# Patient Record
Sex: FEMALE | Race: BLACK OR AFRICAN AMERICAN | Employment: FULL TIME | URBAN - METROPOLITAN AREA
[De-identification: names, ages, dates, MRNs, and addresses within clinical notes are randomized per-mention and may not be internally consistent; named-entity substitution may affect disease eponyms.]

---

## 2017-02-16 ENCOUNTER — HOSPITAL ENCOUNTER (EMERGENCY)
Age: 42
Discharge: HOME | End: 2017-02-16
Attending: EMERGENCY MEDICINE
Payer: SELF-PAY

## 2017-02-16 VITALS
WEIGHT: 140 LBS | HEART RATE: 79 BPM | SYSTOLIC BLOOD PRESSURE: 193 MMHG | BODY MASS INDEX: 24.8 KG/M2 | HEIGHT: 63 IN | OXYGEN SATURATION: 99 % | DIASTOLIC BLOOD PRESSURE: 96 MMHG | RESPIRATION RATE: 16 BRPM | TEMPERATURE: 97.8 F

## 2017-02-16 DIAGNOSIS — M54.6 ACUTE LEFT-SIDED THORACIC BACK PAIN: Primary | ICD-10-CM

## 2017-02-16 PROCEDURE — 99282 EMERGENCY DEPT VISIT SF MDM: CPT

## 2017-02-16 RX ORDER — HYDROCODONE BITARTRATE AND ACETAMINOPHEN 5; 325 MG/1; MG/1
1 TABLET ORAL
Qty: 12 TAB | Refills: 0 | Status: SHIPPED | OUTPATIENT
Start: 2017-02-16 | End: 2017-10-23 | Stop reason: ALTCHOICE

## 2017-02-16 RX ORDER — POTASSIUM CHLORIDE 750 MG/1
20 TABLET, FILM COATED, EXTENDED RELEASE ORAL
COMMUNITY
End: 2017-10-23 | Stop reason: ALTCHOICE

## 2017-02-16 RX ORDER — METAXALONE 800 MG/1
800 TABLET ORAL
Qty: 15 TAB | Refills: 0 | Status: SHIPPED | OUTPATIENT
Start: 2017-02-16 | End: 2017-10-23 | Stop reason: ALTCHOICE

## 2017-02-16 NOTE — ED TRIAGE NOTES
Patient presents ambulatory to treatment area with a steady gait. Patient complains of upper left back pain that began on Saturday. Patient denies injury to the area. Patient was seen at [facility] on Monday for this problem, but the pain persists. Patient has been taking ibuprofen for that pain with no relief.

## 2017-02-16 NOTE — ED PROVIDER NOTES
HPI Comments: 68-year-old  female presents to the emergency department with back pain. Patient reports she's had pain in the left shoulder blade over the past 5 days. Patient reports she was seen at [facility] emergency department 3 days ago. She reports they did a chest x-ray, EKG, CT scan of the chest, blood work. It always normal. She has been taking ibuprofen. She says the pain has persisted. The pain is worse with palpation. Pain is worse if she lifts her left arm over her head. The pain does not radiate. The pain is mild in severity. She denies any chest pain. No shortness of breath. No abdominal pain. No vomiting or diarrhea. Patient denies having any other medical problems. Patient denies any history of heart attack or a blood clot. No allergies to medications. She denies tobacco or alcohol use. The history is provided by the patient. Past Medical History:   Diagnosis Date    Delivery normal     Hypertension        Past Surgical History:   Procedure Laterality Date    Hx cholecystectomy      Hx other surgical       pancreatic cyst  removed    Hx gyn       tubal ligation         Family History:   Problem Relation Age of Onset    Stroke Mother        Social History     Social History    Marital status:      Spouse name: N/A    Number of children: N/A    Years of education: N/A     Occupational History    Not on file. Social History Main Topics    Smoking status: Never Smoker    Smokeless tobacco: Never Used    Alcohol use No    Drug use: No    Sexual activity: Not Currently     Other Topics Concern    Not on file     Social History Narrative         ALLERGIES: Review of patient's allergies indicates no known allergies. Review of Systems   Constitutional: Negative for fever. HENT: Negative for facial swelling and nosebleeds. Eyes: Negative for pain. Respiratory: Negative for cough, chest tightness and shortness of breath.     Cardiovascular: Negative for chest pain and leg swelling. Gastrointestinal: Negative for abdominal pain and vomiting. Endocrine: Negative for polyuria. Genitourinary: Negative for difficulty urinating and flank pain. Musculoskeletal: Positive for arthralgias and back pain. Negative for gait problem, joint swelling, neck pain and neck stiffness. Skin: Negative for color change. Allergic/Immunologic: Negative for immunocompromised state. Neurological: Negative for dizziness and headaches. Hematological: Does not bruise/bleed easily. Psychiatric/Behavioral: Negative for agitation. All other systems reviewed and are negative. Vitals:    02/16/17 1813   BP: (!) 193/96   Pulse: 79   Resp: 16   Temp: 97.8 °F (36.6 °C)   SpO2: 99%   Weight: 63.5 kg (140 lb)   Height: 5' 3\" (1.6 m)            Physical Exam   Constitutional: She is oriented to person, place, and time. She appears well-developed and well-nourished. No distress. HENT:   Head: Normocephalic and atraumatic. Right Ear: External ear normal.   Left Ear: External ear normal.   Nose: Nose normal.   Mouth/Throat: Oropharynx is clear and moist. No oropharyngeal exudate. Eyes: EOM are normal. Pupils are equal, round, and reactive to light. Right eye exhibits no discharge. Left eye exhibits no discharge. No scleral icterus. Neck: Normal range of motion. Neck supple. No JVD present. No tracheal deviation present. No thyromegaly present. Cardiovascular: Normal rate, regular rhythm, normal heart sounds and intact distal pulses. Exam reveals no friction rub. No murmur heard. Pulmonary/Chest: Effort normal and breath sounds normal. No stridor. No respiratory distress. She has no wheezes. She has no rales. She exhibits no tenderness. Abdominal: Soft. Bowel sounds are normal. She exhibits no distension. There is no tenderness. There is no rebound and no guarding. Musculoskeletal: Normal range of motion. She exhibits tenderness.  She exhibits no edema or deformity. Very mild tenderness over the muscle over her left shoulder blade, normal ROM of left shoulder, no pain in right back. Lymphadenopathy:     She has no cervical adenopathy. Neurological: She is alert and oriented to person, place, and time. She has normal reflexes. No cranial nerve deficit. She exhibits normal muscle tone. Coordination normal.   Skin: Skin is warm and dry. No rash noted. She is not diaphoretic. No erythema. No pallor. Psychiatric: She has a normal mood and affect. Her behavior is normal. Judgment and thought content normal.   Nursing note and vitals reviewed. MDM  Number of Diagnoses or Management Options  Diagnosis management comments: Patient has very mild pain in the left shoulder blade. I suspect the pain is muscular. No shortness of breath. Lungs are clear. She had a full workup several days ago at an outside facility. No indication for further testing. We'll treat her with a muscle relaxant, pain medication, continue ibuprofen. Return precautions given. Patient agrees with this plan. Amount and/or Complexity of Data Reviewed  Decide to obtain previous medical records or to obtain history from someone other than the patient: yes  Review and summarize past medical records: yes  Independent visualization of images, tracings, or specimens: yes    Risk of Complications, Morbidity, and/or Mortality  Presenting problems: low  Diagnostic procedures: low  Management options: low    Patient Progress  Patient progress: improved    ED Course       Procedures    Good return precautions given to patient. Close follow up with PCP recommended. Patient and/or family voices understanding of this plan. Discharge instructions were explained by me and all concerns were addressed.

## 2017-02-16 NOTE — DISCHARGE INSTRUCTIONS
Back Pain: Care Instructions  Your Care Instructions    Back pain has many possible causes. It is often related to problems with muscles and ligaments of the back. It may also be related to problems with the nerves, discs, or bones of the back. Moving, lifting, standing, sitting, or sleeping in an awkward way can strain the back. Sometimes you don't notice the injury until later. Arthritis is another common cause of back pain. Although it may hurt a lot, back pain usually improves on its own within several weeks. Most people recover in 12 weeks or less. Using good home treatment and being careful not to stress your back can help you feel better sooner. Follow-up care is a key part of your treatment and safety. Be sure to make and go to all appointments, and call your doctor if you are having problems. Its also a good idea to know your test results and keep a list of the medicines you take. How can you care for yourself at home? · Sit or lie in positions that are most comfortable and reduce your pain. Try one of these positions when you lie down:  ¨ Lie on your back with your knees bent and supported by large pillows. ¨ Lie on the floor with your legs on the seat of a sofa or chair. Clevester Sanes on your side with your knees and hips bent and a pillow between your legs. ¨ Lie on your stomach if it does not make pain worse. · Do not sit up in bed, and avoid soft couches and twisted positions. Bed rest can help relieve pain at first, but it delays healing. Avoid bed rest after the first day of back pain. · Change positions every 30 minutes. If you must sit for long periods of time, take breaks from sitting. Get up and walk around, or lie in a comfortable position. · Try using a heating pad on a low or medium setting for 15 to 20 minutes every 2 or 3 hours. Try a warm shower in place of one session with the heating pad. · You can also try an ice pack for 10 to 15 minutes every 2 to 3 hours.  Put a thin cloth between the ice pack and your skin. · Take pain medicines exactly as directed. ¨ If the doctor gave you a prescription medicine for pain, take it as prescribed. ¨ If you are not taking a prescription pain medicine, ask your doctor if you can take an over-the-counter medicine. · Take short walks several times a day. You can start with 5 to 10 minutes, 3 or 4 times a day, and work up to longer walks. Walk on level surfaces and avoid hills and stairs until your back is better. · Return to work and other activities as soon as you can. Continued rest without activity is usually not good for your back. · To prevent future back pain, do exercises to stretch and strengthen your back and stomach. Learn how to use good posture, safe lifting techniques, and proper body mechanics. When should you call for help? Call your doctor now or seek immediate medical care if:  · You have new or worsening numbness in your legs. · You have new or worsening weakness in your legs. (This could make it hard to stand up.)  · You lose control of your bladder or bowels. Watch closely for changes in your health, and be sure to contact your doctor if:  · Your pain gets worse. · You are not getting better after 2 weeks. Where can you learn more? Go to http://jaleesa-silvia.info/. Enter P891 in the search box to learn more about \"Back Pain: Care Instructions. \"  Current as of: May 23, 2016  Content Version: 11.1  © 6951-9375 [employer]. Care instructions adapted under license by [employer] (which disclaims liability or warranty for this information). If you have questions about a medical condition or this instruction, always ask your healthcare professional. [address] any warranty or liability for your use of this information. We hope that we have addressed all of your medical concerns.  The examination and treatment you received in the Emergency Department were for an emergent problem and were not intended as complete care. It is important that you follow up with your healthcare provider(s) for ongoing care. If your symptoms worsen or do not improve as expected, and you are unable to reach your usual health care provider(s), you should return to the Emergency Department. Today's healthcare is undergoing tremendous change, and patient satisfaction surveys are one of the many tools to assess the quality of medical care. You may receive a survey from the [employer] [place] regarding your experience in the Emergency Department. I hope that your experience has been completely positive, particularly the medical care that I provided. As such, please participate in the survey; anything less than excellent does not meet my expectations or intentions. [facility] and [address] [place] [address] participate in nationally recognized quality of care measures. If your blood pressure is greater than 120/80, as reported below, we urge that you seek medical care to address the potential of high blood pressure, commonly known as hypertension. Hypertension can be hereditary or can be caused by certain medical conditions, pain, stress, or \"white coat syndrome. \"       Please make an appointment with your health care provider(s) for follow up of your Emergency Department visit. VITALS:   Patient Vitals for the past 8 hrs:   Temp Pulse Resp BP SpO2   02/16/17 1813 97.8 °F (36.6 °C) 79 16 (!) 193/96 99 %          Thank you for allowing us to provide you with medical care today. We realize that you have many choices for your emergency care needs. Please choose us in the future for any continued health care needs.       Pablo Mar MD    [facility].   Office: [phone]

## 2017-09-05 ENCOUNTER — HOSPITAL ENCOUNTER (OUTPATIENT)
Dept: MAMMOGRAPHY | Age: 42
Discharge: HOME | End: 2017-09-05
Attending: NURSE PRACTITIONER
Payer: SELF-PAY

## 2017-09-05 DIAGNOSIS — Z12.31 VISIT FOR SCREENING MAMMOGRAM: ICD-10-CM

## 2017-09-05 PROCEDURE — 77067 SCR MAMMO BI INCL CAD: CPT

## 2017-10-04 ENCOUNTER — HOSPITAL ENCOUNTER (OUTPATIENT)
Dept: MAMMOGRAPHY | Age: 42
Discharge: HOME | End: 2017-10-04
Attending: FAMILY MEDICINE

## 2017-10-04 ENCOUNTER — OFFICE VISIT (OUTPATIENT)
Dept: FAMILY PLANNING/WOMEN'S HEALTH CLINIC | Age: 42
End: 2017-10-04

## 2017-10-04 VITALS — SYSTOLIC BLOOD PRESSURE: 178 MMHG | HEART RATE: 75 BPM | DIASTOLIC BLOOD PRESSURE: 109 MMHG

## 2017-10-04 DIAGNOSIS — R92.8 ABNORMAL MAMMOGRAM: Primary | ICD-10-CM

## 2017-10-04 DIAGNOSIS — R92.8 ABNORMAL MAMMOGRAM: ICD-10-CM

## 2017-10-04 PROCEDURE — 77065 DX MAMMO INCL CAD UNI: CPT

## 2017-10-04 NOTE — PROGRESS NOTES
EVERY WOMANS LIFE HISTORY QUESTIONNAIRE       No Yes Comments   Has a doctor ever seen or felt anything wrong with your breast? [x]                                  []                                     Have you ever had a breast biopsy? [x]                                  []                                          When and where was last mammogram performed? None    Have you ever been told that there was a problem on your mammogram?   No Yes Comments   [x]                                  []                                       Do you have breast implants? No Yes Comments   [x]                                  []                                       When was your last Pap test performed? 2 years ago    Have you ever been diagnosed with any type of Cancer   No Yes Comments (type,when,where,type of treatment   [x]                                  []                                          Has a family member been diagnosed with breast or ovarian cancer? No Yes Comments (which family members, and type   [x]                                  []                                         Have you been through menopause? No Yes Date of LMP   [x]                                  []                                       Are you taking hormone replacement therapy (HRT)     No Yes Comments   [x]                                  []                                       How many times have you been pregnant? 3          Number of live births ? 3    Are you experiencing any of the following? No Yes Comments   Nipple Discharge [x]                                  []                                     Breast Lump/Masses [x]                                  []                                     Breast Skin Changes [x]                                  []                                         Any other health problems? HBP      List of Pap Providers given to pt? yes    Pt given care A [place] [facility]ito calendar to follow up with.  Pt has not had insurance and been unable to see PCP. Pt has been on HBP meds but has no refill. Explained how care a Mal Dietz worked, Pt verbalized understanding and denies questions. Pt told if she experiences any shortness of breath, headache, chest pain, dizziness, pain radiating to jaw, back, arm that she should go to ED, Pt verbalized understanding and denies questions.

## 2017-10-04 NOTE — PROGRESS NOTES
HISTORY OF PRESENT ILLNESS  Ernestina Lombardo is a 43 y.o. female. HPI Comments: H/o recent mammo showing asymmetric breast tissue, concerning on the right. She has felt no lumps. Currently is not being followed for her htn. Lives in [facility] where the [facility] comes and where there is a [address]. Reports she has transport problems and difficulty [facility].  Usually gets hctz rx when she has to go to ED for other reasons. [facility]         ROS    Physical Exam   Pulmonary/Chest:   Breasts without dominant mass. No nipple d/c,no axillary masses. ASSESSMENT and PLAN  Additional views of Right breast scheduled today. Discussed that she can go to [facility] in [address] of this month, get there before 8.

## 2017-10-23 ENCOUNTER — OFFICE VISIT (OUTPATIENT)
Dept: FAMILY MEDICINE CLINIC | Age: 42
End: 2017-10-23

## 2017-10-23 ENCOUNTER — HOSPITAL ENCOUNTER (OUTPATIENT)
Dept: LAB | Age: 42
Discharge: HOME | End: 2017-10-23

## 2017-10-23 VITALS
WEIGHT: 154 LBS | BODY MASS INDEX: 27.29 KG/M2 | SYSTOLIC BLOOD PRESSURE: 165 MMHG | DIASTOLIC BLOOD PRESSURE: 102 MMHG | HEIGHT: 63 IN | HEART RATE: 67 BPM | TEMPERATURE: 97.7 F

## 2017-10-23 DIAGNOSIS — G89.29 CHRONIC NONINTRACTABLE HEADACHE, UNSPECIFIED HEADACHE TYPE: ICD-10-CM

## 2017-10-23 DIAGNOSIS — R51.9 CHRONIC NONINTRACTABLE HEADACHE, UNSPECIFIED HEADACHE TYPE: ICD-10-CM

## 2017-10-23 DIAGNOSIS — I10 ESSENTIAL HYPERTENSION: Primary | ICD-10-CM

## 2017-10-23 DIAGNOSIS — I10 ESSENTIAL HYPERTENSION: ICD-10-CM

## 2017-10-23 LAB
ALBUMIN SERPL-MCNC: 4.1 G/DL (ref 3.5–5)
ALBUMIN/GLOB SERPL: 1 {RATIO} (ref 1.1–2.2)
ALP SERPL-CCNC: 82 U/L (ref 45–117)
ALT SERPL-CCNC: 24 U/L (ref 12–78)
ANION GAP SERPL CALC-SCNC: 8 MMOL/L (ref 5–15)
AST SERPL-CCNC: 14 U/L (ref 15–37)
BILIRUB SERPL-MCNC: 0.4 MG/DL (ref 0.2–1)
BUN SERPL-MCNC: 12 MG/DL (ref 6–20)
BUN/CREAT SERPL: 14 (ref 12–20)
CALCIUM SERPL-MCNC: 9.1 MG/DL (ref 8.5–10.1)
CHLORIDE SERPL-SCNC: 101 MMOL/L (ref 97–108)
CHOLEST SERPL-MCNC: 170 MG/DL
CO2 SERPL-SCNC: 29 MMOL/L (ref 21–32)
CREAT SERPL-MCNC: 0.85 MG/DL (ref 0.55–1.02)
ERYTHROCYTE [DISTWIDTH] IN BLOOD BY AUTOMATED COUNT: 12.8 % (ref 11.5–14.5)
EST. AVERAGE GLUCOSE BLD GHB EST-MCNC: 111 MG/DL
GLOBULIN SER CALC-MCNC: 4.1 G/DL (ref 2–4)
GLUCOSE SERPL-MCNC: 91 MG/DL (ref 65–100)
HBA1C MFR BLD: 5.5 % (ref 4.2–6.3)
HCT VFR BLD AUTO: 38.5 % (ref 35–47)
HDLC SERPL-MCNC: 55 MG/DL
HDLC SERPL: 3.1 {RATIO} (ref 0–5)
HGB BLD-MCNC: 13.1 G/DL (ref 11.5–16)
LDLC SERPL CALC-MCNC: 102 MG/DL (ref 0–100)
LIPID PROFILE,FLP: ABNORMAL
MCH RBC QN AUTO: 29.6 PG (ref 26–34)
MCHC RBC AUTO-ENTMCNC: 34 G/DL (ref 30–36.5)
MCV RBC AUTO: 87.1 FL (ref 80–99)
PLATELET # BLD AUTO: 267 K/UL (ref 150–400)
POTASSIUM SERPL-SCNC: 3.7 MMOL/L (ref 3.5–5.1)
PROT SERPL-MCNC: 8.2 G/DL (ref 6.4–8.2)
RBC # BLD AUTO: 4.42 M/UL (ref 3.8–5.2)
SODIUM SERPL-SCNC: 138 MMOL/L (ref 136–145)
TRIGL SERPL-MCNC: 65 MG/DL (ref ?–150)
VLDLC SERPL CALC-MCNC: 13 MG/DL
WBC # BLD AUTO: 5.3 K/UL (ref 3.6–11)

## 2017-10-23 PROCEDURE — 85027 COMPLETE CBC AUTOMATED: CPT | Performed by: NURSE PRACTITIONER

## 2017-10-23 PROCEDURE — 80053 COMPREHEN METABOLIC PANEL: CPT | Performed by: NURSE PRACTITIONER

## 2017-10-23 PROCEDURE — 80061 LIPID PANEL: CPT | Performed by: NURSE PRACTITIONER

## 2017-10-23 PROCEDURE — 83036 HEMOGLOBIN GLYCOSYLATED A1C: CPT | Performed by: NURSE PRACTITIONER

## 2017-10-23 RX ORDER — LISINOPRIL AND HYDROCHLOROTHIAZIDE 12.5; 2 MG/1; MG/1
1 TABLET ORAL DAILY
Qty: 90 TAB | Refills: 0 | Status: SHIPPED | OUTPATIENT
Start: 2017-10-23 | End: 2017-11-27 | Stop reason: DRUGHIGH

## 2017-10-23 RX ORDER — AMLODIPINE BESYLATE 10 MG/1
10 TABLET ORAL DAILY
Qty: 30 TAB | Refills: 3 | Status: SHIPPED | OUTPATIENT
Start: 2017-10-23 | End: 2017-11-27 | Stop reason: SDUPTHER

## 2017-10-23 NOTE — PROGRESS NOTES
Subjective:     Chief Complaint   Patient presents with    Hypertension     follow up     Headache        She  is a 43 y.o. female who presents for evaluation of HTN. Pt is currently taking Lisinopril/HCTZ as Rx'd when she was admitted at [address] [place] [address] for 2-3 days earlier this month r/t  HTN, h/a and CP. Since starting Lisinopril/HCTZ 20/12.5, well tolerated and her CP has resolved. H/A still continues since starting Rx. No prior HTN use aside from HCTZ and Lisinopril. Current Rx back. PMH: HTN    Surg:  Partial pancreatectomy, lap appendectomey and surgical sterilization. NKDA     Current Rx/supplements: denies     Social:  Pt denies tobacco, etoh nor drug use. ROS  Gen - no fever/chills  Resp - no dyspnea or cough  CV - no chest pain or HUNTLEY  Rest per HPI    Past Medical History:   Diagnosis Date    Delivery normal     Hypertension      Past Surgical History:   Procedure Laterality Date    HX CHOLECYSTECTOMY      HX GYN      tubal ligation    HX OTHER SURGICAL      pancreatic cyst  removed     Current Outpatient Prescriptions on File Prior to Visit   Medication Sig Dispense Refill    potassium chloride SR (KLOR-CON 10) 10 mEq tablet Take 20 mEq by mouth.  metaxalone (SKELAXIN) 800 mg tablet Take 1 Tab by mouth three (3) times daily as needed for Pain (muscle spasm). 15 Tab 0    HYDROcodone-acetaminophen (NORCO) 5-325 mg per tablet Take 1 Tab by mouth every six (6) hours as needed for Pain. Max Daily Amount: 4 Tabs. 12 Tab 0    LISINOPRIL-HYDROCHLOROTHIAZIDE PO Take  by mouth.  ibuprofen (MOTRIN) 600 mg tablet Take 1 Tab by mouth every six (6) hours as needed for Pain. 20 Tab 0    Aspirin, Buffered 81 mg tab Take  by mouth. No current facility-administered medications on file prior to visit.          Objective:     Vitals:    10/23/17 1112 10/23/17 1117   BP: (!) 178/100 (!) 165/102   Pulse: 66 67   Temp: 97.7 °F (36.5 °C)    TempSrc: Oral    Weight: 154 lb (69.9 kg)    Height: 5' 2.8\" (1.595 m)        Physical Examination:  General appearance - alert, well appearing, and in no distress  Eyes -sclera anicteric  Neck - supple, no significant adenopathy, no thyromegaly  Chest - clear to auscultation, no wheezes, rales or rhonchi, symmetric air entry  Heart - normal rate, regular rhythm, normal S1, S2, no murmurs, rubs, clicks or gallops  Neurological - alert, oriented, no focal findings or movement disorder noted  Abdomen-BS present/WNL x 4 quads, non-tender/distended, soft,no organomegaly    Assessment/ Plan:   Diagnoses and all orders for this visit:    1. Essential hypertension  -     lisinopril-hydroCHLOROthiazide (PRINZIDE, ZESTORETIC) 20-12.5 mg per tablet; Take 1 Tab by mouth daily. -     amLODIPine (NORVASC) 10 mg tablet; Take 1 Tab by mouth daily.  -     METABOLIC PANEL, COMPREHENSIVE; Future  -     HEMOGLOBIN A1C WITH EAG; Future  -     CBC W/O DIFF; Future  -     LIPID PANEL; Future    2. Chronic nonintractable headache, unspecified headache type      Add Norvasc for improved BP control. Will attempt to hospital records and echo/labs/CT scan. Advised to start 1/2 tab of norvasc x 4-5 days then full tab. Check renal function, lipids, screen for DM 2 and hypercholesterolemia. RTC in one month. Received flu shot in the hospital.     I have discussed the diagnosis with the patient and the intended plan as seen in the above orders. The patient has received an after-visit summary and questions were answered concerning future plans. I have discussed medication side effects and warnings with the patient as well. The patient verbalizes understanding and agreement with the plan.     Follow-up Disposition: Not on File

## 2017-10-23 NOTE — PROGRESS NOTES
Printed AVS, provided to pt and reviewed. Pt indicated understanding and had no questions. Told pt that rx's have been sent to pharmacy and they should be ready for  in approximately 2 hrs. Reviewed all medications ordered today with the pt. Provided pt with eyecare resources.  Pt signed authorization for release of medical records from [address]

## 2017-10-23 NOTE — MR AVS SNAPSHOT
Visit Information Date & Time Provider Department Dept. Phone Encounter #  
 10/23/2017 10:45 [phone] Follow-up Instructions Return in about 4 weeks (around 11/20/2017). Upcoming Health Maintenance Date Due DTaP/Tdap/Td series (1 - Tdap) 9/18/1996 PAP AKA CERVICAL CYTOLOGY 9/18/1996 INFLUENZA AGE 9 TO ADULT 8/1/2017 Allergies as of 10/23/2017  Review Complete On: 10/23/2017 By: Puja Lei NP No Known Allergies Current Immunizations  Reviewed on 6/14/2011 No immunizations on file. Not reviewed this visit You Were Diagnosed With   
  
 Codes Comments Essential hypertension    -  Primary ICD-10-CM: I10 
ICD-9-CM: 401.9 Chronic nonintractable headache, unspecified headache type     ICD-10-CM: R51 ICD-9-CM: 731. 0 Vitals BP Pulse Temp Height(growth percentile) Weight(growth percentile) LMP  
 (!) 165/102 (BP 1 Location: Right arm, BP Patient Position: Sitting) 67 97.7 °F (36.5 °C) (Oral) 5' 2.8\" (1.595 m) 154 lb (69.9 kg) 10/21/2017 BMI OB Status Smoking Status 27.46 kg/m2 Having regular periods Never Smoker Vitals History BMI and BSA Data Body Mass Index Body Surface Area  
 27.46 kg/m 2 1.76 m 2 Preferred Pharmacy Pharmacy Name Phone [phone] [facility] [phone] Your Updated Medication List  
  
   
This list is accurate as of: 10/23/17 12:25 PM.  Always use your most recent med list. amLODIPine 10 mg tablet Commonly known as:  Elchon Coffer Take 1 Tab by mouth daily. aspirin, buffered 81 mg Tab Take  by mouth.  
  
 lisinopril-hydroCHLOROthiazide 20-12.5 mg per tablet Commonly known as:  Domingo Chambers Take 1 Tab by mouth daily. Prescriptions Sent to Pharmacy Refills lisinopril-hydroCHLOROthiazide (PRINZIDE, ZESTORETIC) 20-12.5 mg per tablet 0 Sig: Take 1 Tab by mouth daily. Class: Normal  
 Pharmacy: [place] [address]  #: [phone] Route: Oral  
 amLODIPine (NORVASC) 10 mg tablet 3 Sig: Take 1 Tab by mouth daily. Class: Normal  
 Pharmacy: [place] [address]  #: [phone] Route: Oral  
  
Follow-up Instructions Return in about 4 weeks (around 11/20/2017). To-Do List   
 10/23/2017 Lab:  CBC W/O DIFF   
  
 10/23/2017 Lab:  HEMOGLOBIN A1C WITH EAG   
  
 10/23/2017 Lab:  LIPID PANEL   
  
 10/23/2017 Lab:  METABOLIC PANEL, COMPREHENSIVE Patient Instructions Amlodipine (By mouth) Amlodipine (lr-NPA-mb-peen) Treats high blood pressure and angina (chest pain). This medicine is a calcium channel blocker. Brand Name(s): Norvasc There may be other brand names for this medicine. When This Medicine Should Not Be Used: This medicine is not right for everyone. Do not use it if you had an allergic reaction to amlodipine. How to Use This Medicine:  
Tablet, Dissolving Tablet · Take your medicine as directed. Your dose may need to be changed several times to find what works best for you. Take this medicine at the same time each day. · Read and follow the patient instructions that come with this medicine. Talk to your doctor or pharmacist if you have any questions. · Missed dose: Take a dose as soon as you remember. If it has been more than 12 hours since you were supposed to take your dose, skip the missed dose and take your next regular dose at the regular time. · Store the medicine in a closed container at room temperature, away from heat, moisture, and direct light. Drugs and Foods to Avoid: Ask your doctor or pharmacist before using any other medicine, including over-the-counter medicines, vitamins, and herbal products. · Some medicines can affect how amlodipine works. Tell your doctor if you are also using any of the following: ¨ Clarithromycin, cyclosporine, diltiazem, itraconazole, ritonavir, sildenafil, simvastatin, tacrolimus Warnings While Using This Medicine: · Tell your doctor if you are pregnant or breastfeeding, or if you have liver disease, heart disease, coronary artery disease, or aortic stenosis. · This medicine could lower your blood pressure too much, especially when you first use it or if you are dehydrated. Stand or sit up slowly if you feel lightheaded or dizzy. · Your doctor will check your progress and the effects of this medicine at regular visits. Keep all appointments. · Do not stop using this medicine without asking your doctor, even if you feel well. This medicine will not cure high blood pressure, but it will help keep it in normal range. You may have to take blood pressure medicine for the rest of your life. · Keep all medicine out of the reach of children. Never share your medicine with anyone. Possible Side Effects While Using This Medicine:  
Call your doctor right away if you notice any of these side effects: · Allergic reaction: Itching or hives, swelling in your face or hands, swelling or tingling in your mouth or throat, chest tightness, trouble breathing · Lightheadedness, dizziness · New or worsening chest pain · Swelling in your hands, ankles, or legs · Trouble breathing, nausea, unusual sweating, fainting If you notice other side effects that you think are caused by this medicine, tell your doctor. Call your doctor for medical advice about side effects. You may report side effects to FDA at 3-654-DOD-3055 © 2017 [address] Information is for End User's use only and may not be sold, redistributed or otherwise used for commercial purposes. The above information is an  only.  It is not intended as medical advice for individual conditions or treatments. Talk to your doctor, nurse or pharmacist before following any medical regimen to see if it is safe and effective for you. DASH Diet: Care Instructions Your Care Instructions The DASH diet is an eating plan that can help lower your blood pressure. DASH stands for Dietary Approaches to Stop Hypertension. Hypertension is high blood pressure. The DASH diet focuses on eating foods that are high in calcium, potassium, and magnesium. These nutrients can lower blood pressure. The foods that are highest in these nutrients are fruits, vegetables, low-fat dairy products, nuts, seeds, and legumes. But taking calcium, potassium, and magnesium supplements instead of eating foods that are high in those nutrients does not have the same effect. The DASH diet also includes whole grains, fish, and poultry. The DASH diet is one of several lifestyle changes your doctor may recommend to lower your high blood pressure. Your doctor may also want you to decrease the amount of sodium in your diet. Lowering sodium while following the DASH diet can lower blood pressure even further than just the DASH diet alone. Follow-up care is a key part of your treatment and safety. Be sure to make and go to all appointments, and call your doctor if you are having problems. It's also a good idea to know your test results and keep a list of the medicines you take. How can you care for yourself at home? Following the DASH diet · Eat 4 to 5 servings of fruit each day. A serving is 1 medium-sized piece of fruit, ½ cup chopped or canned fruit, 1/4 cup dried fruit, or 4 ounces (½ cup) of fruit juice. Choose fruit more often than fruit juice. · Eat 4 to 5 servings of vegetables each day. A serving is 1 cup of lettuce or raw leafy vegetables, ½ cup of chopped or cooked vegetables, or 4 ounces (½ cup) of vegetable juice. Choose vegetables more often than vegetable juice. · Get 2 to 3 servings of low-fat and fat-free dairy each day. A serving is 8 ounces of milk, 1 cup of yogurt, or 1 ½ ounces of cheese. · Eat 6 to 8 servings of grains each day. A serving is 1 slice of bread, 1 ounce of dry cereal, or ½ cup of cooked rice, pasta, or cooked cereal. Try to choose whole-grain products as much as possible. · Limit lean meat, poultry, and fish to 2 servings each day. A serving is 3 ounces, about the size of a deck of cards. · Eat 4 to 5 servings of nuts, seeds, and legumes (cooked dried beans, lentils, and split peas) each week. A serving is 1/3 cup of nuts, 2 tablespoons of seeds, or ½ cup of cooked beans or peas. · Limit fats and oils to 2 to 3 servings each day. A serving is 1 teaspoon of vegetable oil or 2 tablespoons of salad dressing. · Limit sweets and added sugars to 5 servings or less a week. A serving is 1 tablespoon jelly or jam, ½ cup sorbet, or 1 cup of lemonade. · Eat less than 2,300 milligrams (mg) of sodium a day. If you limit your sodium to 1,500 mg a day, you can lower your blood pressure even more. Tips for success · Start small. Do not try to make dramatic changes to your diet all at once. You might feel that you are missing out on your favorite foods and then be more likely to not follow the plan. Make small changes, and stick with them. Once those changes become habit, add a few more changes. · Try some of the following: ¨ Make it a goal to eat a fruit or vegetable at every meal and at snacks. This will make it easy to get the recommended amount of fruits and vegetables each day. ¨ Try yogurt topped with fruit and nuts for a snack or healthy dessert. ¨ Add lettuce, tomato, cucumber, and onion to sandwiches. ¨ Combine a ready-made pizza crust with low-fat mozzarella cheese and lots of vegetable toppings. Try using tomatoes, squash, spinach, broccoli, carrots, cauliflower, and onions. ¨ Have a variety of cut-up vegetables with a low-fat dip as an appetizer instead of chips and dip. ¨ Sprinkle sunflower seeds or chopped almonds over salads. Or try adding chopped walnuts or almonds to cooked vegetables. ¨ Try some vegetarian meals using beans and peas. Add garbanzo or kidney beans to salads. Make burritos and tacos with mashed lynn beans or black beans. Where can you learn more? Go to http://jaleesa-silvia.info/. Enter G096 in the search box to learn more about \"DASH Diet: Care Instructions. \" Current as of: April 3, 2017 Content Version: 11.3 © 3068-8043 [employer]Where. Care instructions adapted under license by [employer] (which disclaims liability or warranty for this information). If you have questions about a medical condition or this instruction, always ask your healthcare professional. [place] [address] any warranty or liability for your use of this information. Introducing [facility]! Dear Sandy Fenton: Thank you for requesting a [employer] account. Our records indicate that you already have an active [employer] account. You can access your account anytime at [email or web address] Did you know that you can access your hospital and ER discharge instructions at any time in [employer]? You can also review all of your test results from your hospital stay or ER visit. Additional Information If you have questions, please visit the Frequently Asked Questions section of the [employer] website at [email or web address] Remember, [employer] is NOT to be used for urgent needs. For medical emergencies, dial 911. Now available from your iPhone and Android! Please provide this summary of care documentation to your next provider. Your primary care clinician is listed as Phys Other. If you have any questions after today's visit, please call [phone].

## 2017-10-23 NOTE — PATIENT INSTRUCTIONS
Amlodipine (By mouth)   Amlodipine (tx-NIQ-qd-peen)  Treats high blood pressure and angina (chest pain). This medicine is a calcium channel blocker. Brand Name(s): Norvasc   There may be other brand names for this medicine. When This Medicine Should Not Be Used: This medicine is not right for everyone. Do not use it if you had an allergic reaction to amlodipine. How to Use This Medicine:   Tablet, Dissolving Tablet  · Take your medicine as directed. Your dose may need to be changed several times to find what works best for you. Take this medicine at the same time each day. · Read and follow the patient instructions that come with this medicine. Talk to your doctor or pharmacist if you have any questions. · Missed dose: Take a dose as soon as you remember. If it has been more than 12 hours since you were supposed to take your dose, skip the missed dose and take your next regular dose at the regular time. · Store the medicine in a closed container at room temperature, away from heat, moisture, and direct light. Drugs and Foods to Avoid:   Ask your doctor or pharmacist before using any other medicine, including over-the-counter medicines, vitamins, and herbal products. · Some medicines can affect how amlodipine works. Tell your doctor if you are also using any of the following:   ¨ Clarithromycin, cyclosporine, diltiazem, itraconazole, ritonavir, sildenafil, simvastatin, tacrolimus  Warnings While Using This Medicine:   · Tell your doctor if you are pregnant or breastfeeding, or if you have liver disease, heart disease, coronary artery disease, or aortic stenosis. · This medicine could lower your blood pressure too much, especially when you first use it or if you are dehydrated. Stand or sit up slowly if you feel lightheaded or dizzy. · Your doctor will check your progress and the effects of this medicine at regular visits. Keep all appointments.   · Do not stop using this medicine without asking your doctor, even if you feel well. This medicine will not cure high blood pressure, but it will help keep it in normal range. You may have to take blood pressure medicine for the rest of your life. · Keep all medicine out of the reach of children. Never share your medicine with anyone. Possible Side Effects While Using This Medicine:   Call your doctor right away if you notice any of these side effects:  · Allergic reaction: Itching or hives, swelling in your face or hands, swelling or tingling in your mouth or throat, chest tightness, trouble breathing  · Lightheadedness, dizziness  · New or worsening chest pain  · Swelling in your hands, ankles, or legs  · Trouble breathing, nausea, unusual sweating, fainting  If you notice other side effects that you think are caused by this medicine, tell your doctor. Call your doctor for medical advice about side effects. You may report side effects to FDA at 5-169-FDA-5815  © 2017 [address] Information is for End User's use only and may not be sold, redistributed or otherwise used for commercial purposes. The above information is an  only. It is not intended as medical advice for individual conditions or treatments. Talk to your doctor, nurse or pharmacist before following any medical regimen to see if it is safe and effective for you. DASH Diet: Care Instructions  Your Care Instructions  The DASH diet is an eating plan that can help lower your blood pressure. DASH stands for Dietary Approaches to Stop Hypertension. Hypertension is high blood pressure. The DASH diet focuses on eating foods that are high in calcium, potassium, and magnesium. These nutrients can lower blood pressure. The foods that are highest in these nutrients are fruits, vegetables, low-fat dairy products, nuts, seeds, and legumes.  But taking calcium, potassium, and magnesium supplements instead of eating foods that are high in those nutrients does not have the same effect. The DASH diet also includes whole grains, fish, and poultry. The DASH diet is one of several lifestyle changes your doctor may recommend to lower your high blood pressure. Your doctor may also want you to decrease the amount of sodium in your diet. Lowering sodium while following the DASH diet can lower blood pressure even further than just the DASH diet alone. Follow-up care is a key part of your treatment and safety. Be sure to make and go to all appointments, and call your doctor if you are having problems. It's also a good idea to know your test results and keep a list of the medicines you take. How can you care for yourself at home? Following the DASH diet  · Eat 4 to 5 servings of fruit each day. A serving is 1 medium-sized piece of fruit, ½ cup chopped or canned fruit, 1/4 cup dried fruit, or 4 ounces (½ cup) of fruit juice. Choose fruit more often than fruit juice. · Eat 4 to 5 servings of vegetables each day. A serving is 1 cup of lettuce or raw leafy vegetables, ½ cup of chopped or cooked vegetables, or 4 ounces (½ cup) of vegetable juice. Choose vegetables more often than vegetable juice. · Get 2 to 3 servings of low-fat and fat-free dairy each day. A serving is 8 ounces of milk, 1 cup of yogurt, or 1 ½ ounces of cheese. · Eat 6 to 8 servings of grains each day. A serving is 1 slice of bread, 1 ounce of dry cereal, or ½ cup of cooked rice, pasta, or cooked cereal. Try to choose whole-grain products as much as possible. · Limit lean meat, poultry, and fish to 2 servings each day. A serving is 3 ounces, about the size of a deck of cards. · Eat 4 to 5 servings of nuts, seeds, and legumes (cooked dried beans, lentils, and split peas) each week. A serving is 1/3 cup of nuts, 2 tablespoons of seeds, or ½ cup of cooked beans or peas. · Limit fats and oils to 2 to 3 servings each day. A serving is 1 teaspoon of vegetable oil or 2 tablespoons of salad dressing.   · Limit sweets and added sugars to 5 servings or less a week. A serving is 1 tablespoon jelly or jam, ½ cup sorbet, or 1 cup of lemonade. · Eat less than 2,300 milligrams (mg) of sodium a day. If you limit your sodium to 1,500 mg a day, you can lower your blood pressure even more. Tips for success  · Start small. Do not try to make dramatic changes to your diet all at once. You might feel that you are missing out on your favorite foods and then be more likely to not follow the plan. Make small changes, and stick with them. Once those changes become habit, add a few more changes. · Try some of the following:  ¨ Make it a goal to eat a fruit or vegetable at every meal and at snacks. This will make it easy to get the recommended amount of fruits and vegetables each day. ¨ Try yogurt topped with fruit and nuts for a snack or healthy dessert. ¨ Add lettuce, tomato, cucumber, and onion to sandwiches. ¨ Combine a ready-made pizza crust with low-fat mozzarella cheese and lots of vegetable toppings. Try using tomatoes, squash, spinach, broccoli, carrots, cauliflower, and onions. ¨ Have a variety of cut-up vegetables with a low-fat dip as an appetizer instead of chips and dip. ¨ Sprinkle sunflower seeds or chopped almonds over salads. Or try adding chopped walnuts or almonds to cooked vegetables. ¨ Try some vegetarian meals using beans and peas. Add garbanzo or kidney beans to salads. Make burritos and tacos with mashed lynn beans or black beans. Where can you learn more? Go to http://jaleesa-silvia.info/. Enter G696 in the search box to learn more about \"DASH Diet: Care Instructions. \"  Current as of: April 3, 2017  Content Version: 11.3  © 3487-2156 [employer]. Care instructions adapted under license by [employer] (which disclaims liability or warranty for this information).  If you have questions about a medical condition or this instruction, always ask your healthcare professional. [employer] disclaims any warranty or liability for your use of this information.

## 2017-11-27 ENCOUNTER — OFFICE VISIT (OUTPATIENT)
Dept: FAMILY MEDICINE CLINIC | Age: 42
End: 2017-11-27

## 2017-11-27 VITALS
TEMPERATURE: 97.8 F | BODY MASS INDEX: 27.24 KG/M2 | DIASTOLIC BLOOD PRESSURE: 97 MMHG | HEART RATE: 72 BPM | WEIGHT: 152.8 LBS | SYSTOLIC BLOOD PRESSURE: 156 MMHG

## 2017-11-27 DIAGNOSIS — I10 ESSENTIAL HYPERTENSION: Primary | ICD-10-CM

## 2017-11-27 RX ORDER — LISINOPRIL AND HYDROCHLOROTHIAZIDE 20; 25 MG/1; MG/1
1 TABLET ORAL DAILY
Qty: 90 TAB | Refills: 3 | Status: SHIPPED | OUTPATIENT
Start: 2017-11-27 | End: 2019-04-22 | Stop reason: SDUPTHER

## 2017-11-27 RX ORDER — AMLODIPINE BESYLATE 10 MG/1
10 TABLET ORAL DAILY
Qty: 90 TAB | Refills: 3 | Status: SHIPPED | OUTPATIENT
Start: 2017-11-27 | End: 2019-04-22 | Stop reason: SDUPTHER

## 2017-11-27 NOTE — PROGRESS NOTES
Printed AVS, provided to pt and reviewed. Pt indicated understanding and had no questions. Told pt that rx's have been sent to pharmacy and they should be ready for  in approximately 2 hrs. Please present GoodRx. com coupon which we provide to your pharmacy to receive discounted price. Th ept was told her Pharmacy did not take the good Rx coupon but she could have the rx's transferred if they were too expensive at her Pharmacy. Her [address] [place] [address] Ave does however have a discount medication program. You have to jion by paying $10 a year to get the discount. The godd rx is free. The pt stated she only paid about $16 for both medications the last time she bought them at her Pharmacy so she felt like it would be fine to leave her rx's there at the [facility].    Gladis Hanks RN

## 2017-11-27 NOTE — PROGRESS NOTES
Subjective:     Chief Complaint   Patient presents with    Follow-up     For HTN        She  is a 43 y.o. female who presents for evaluation of HTN. Since LOV, Pt reports Norvasc has been well tolerated. Does not check BP at home. Reports not eating much salt at home though has a lot of prepared/frozen dinners. Has not had pap in > 3 years. Unable to drive to [facility]. Received flu vaccine at [address]. ROS  Gen - no fever/chills  Resp - no dyspnea or cough  CV - no chest pain or HUNTLEY  Rest per HPI    Past Medical History:   Diagnosis Date    Delivery normal     Hypertension      Past Surgical History:   Procedure Laterality Date    HX CHOLECYSTECTOMY      HX GYN      tubal ligation    HX OTHER SURGICAL      pancreatic cyst  removed     Current Outpatient Prescriptions on File Prior to Visit   Medication Sig Dispense Refill    Aspirin, Buffered 81 mg tab Take  by mouth. No current facility-administered medications on file prior to visit. Objective:     Vitals:    11/27/17 1447 11/27/17 1450   BP: (!) 149/99 (!) 156/97   Pulse: 73 72   Temp: 97.8 °F (36.6 °C)    TempSrc: Oral    Weight: 152 lb 12.8 oz (69.3 kg)        Physical Examination:  General appearance - alert, well appearing, and in no distress  Eyes -sclera anicteric  Neck - supple, no significant adenopathy, no thyromegaly  Chest - clear to auscultation, no wheezes, rales or rhonchi, symmetric air entry  Heart - normal rate, regular rhythm, normal S1, S2, no murmurs, rubs, clicks or gallops  Neurological - alert, oriented, no focal findings or movement disorder noted        Assessment/ Plan:   Diagnoses and all orders for this visit:    1. Essential hypertension  -     amLODIPine (NORVASC) 10 mg tablet; Take 1 Tab by mouth daily. -     lisinopril-hydroCHLOROthiazide (PRINZIDE, ZESTORETIC) 20-25 mg per tablet; Take 1 Tab by mouth daily. Last renal function/K+ WNL.      Increase Lisinopril/HCTZ to 20/25 for improved BP control. Encouraged Pt to work on reg CV exercise and limit Na+ rich foods. Refer to HD for pap/WWE. RTC in 3-6 months. I have discussed the diagnosis with the patient and the intended plan as seen in the above orders. The patient has received an after-visit summary and questions were answered concerning future plans. I have discussed medication side effects and warnings with the patient as well. The patient verbalizes understanding and agreement with the plan.     Follow-up Disposition: Not on File

## 2017-11-27 NOTE — PROGRESS NOTES
Coordination of Care  1. Have you been to the ER, urgent care clinic since your last visit? Hospitalized since your last visit? No    2. Have you seen or consulted any other health care providers outside of the [address] [place] [address] since your last visit? Include any pap smears or colon screening. No    Medications  Does the patient need refills? NO    Learning Assessment Complete?  yes

## 2017-11-27 NOTE — PATIENT INSTRUCTIONS

## 2017-11-27 NOTE — MR AVS SNAPSHOT
Visit Information Date & Time Provider Department Dept. Phone Encounter #  
 11/27/2017  1:30 PM Pratik Stanton [phone] [id number] Upcoming Health Maintenance Date Due DTaP/Tdap/Td series (1 - Tdap) 9/18/1996 PAP AKA CERVICAL CYTOLOGY 9/18/1996 Influenza Age 5 to Adult 8/1/2017 Allergies as of 11/27/2017  Review Complete On: 11/27/2017 By: Guardado Mcallen No Known Allergies Current Immunizations  Reviewed on 6/14/2011 No immunizations on file. Not reviewed this visit You Were Diagnosed With   
  
 Codes Comments Essential hypertension    -  Primary ICD-10-CM: I10 
ICD-9-CM: 401.9 Vitals BP Pulse Temp Weight(growth percentile) LMP BMI  
 (!) 156/97 (BP 1 Location: Right arm, BP Patient Position: Sitting) 72 97.8 °F (36.6 °C) (Oral) 152 lb 12.8 oz (69.3 kg) 11/14/2017 27.24 kg/m2 OB Status Smoking Status Having regular periods Never Smoker Vitals History BMI and BSA Data Body Mass Index Body Surface Area  
 27.24 kg/m 2 1.75 m 2 Preferred Pharmacy Pharmacy Name Phone [facility], [address] [phone] Your Updated Medication List  
  
   
This list is accurate as of: 11/27/17  4:00 PM.  Always use your most recent med list. amLODIPine 10 mg tablet Commonly known as:  Tigre Alstrom Take 1 Tab by mouth daily. aspirin, buffered 81 mg Tab Take  by mouth.  
  
 lisinopril-hydroCHLOROthiazide 20-25 mg per tablet Commonly known as:  Helyn Blinks Take 1 Tab by mouth daily. Prescriptions Sent to Pharmacy Refills  
 amLODIPine (NORVASC) 10 mg tablet 3 Sig: Take 1 Tab by mouth daily. Class: Normal  
 Pharmacy: [place] [facility] [address] [address]  #: [phone]  Route: Oral  
 lisinopril-hydroCHLOROthiazide (PRINZIDE, ZESTORETIC) 20-25 mg per tablet 3 Sig: Take 1 Tab by mouth daily. Class: Normal  
 Pharmacy: [place] [place] [facility] [address]  #: [phone] Route: Oral  
  
Patient Instructions DASH Diet: Care Instructions Your Care Instructions The DASH diet is an eating plan that can help lower your blood pressure. DASH stands for Dietary Approaches to Stop Hypertension. Hypertension is high blood pressure. The DASH diet focuses on eating foods that are high in calcium, potassium, and magnesium. These nutrients can lower blood pressure. The foods that are highest in these nutrients are fruits, vegetables, low-fat dairy products, nuts, seeds, and legumes. But taking calcium, potassium, and magnesium supplements instead of eating foods that are high in those nutrients does not have the same effect. The DASH diet also includes whole grains, fish, and poultry. The DASH diet is one of several lifestyle changes your doctor may recommend to lower your high blood pressure. Your doctor may also want you to decrease the amount of sodium in your diet. Lowering sodium while following the DASH diet can lower blood pressure even further than just the DASH diet alone. Follow-up care is a key part of your treatment and safety. Be sure to make and go to all appointments, and call your doctor if you are having problems. It's also a good idea to know your test results and keep a list of the medicines you take. How can you care for yourself at home? Following the DASH diet · Eat 4 to 5 servings of fruit each day. A serving is 1 medium-sized piece of fruit, ½ cup chopped or canned fruit, 1/4 cup dried fruit, or 4 ounces (½ cup) of fruit juice. Choose fruit more often than fruit juice. · Eat 4 to 5 servings of vegetables each day.  A serving is 1 cup of lettuce or raw leafy vegetables, ½ cup of chopped or cooked vegetables, or 4 ounces (½ cup) of vegetable juice. Choose vegetables more often than vegetable juice. · Get 2 to 3 servings of low-fat and fat-free dairy each day. A serving is 8 ounces of milk, 1 cup of yogurt, or 1 ½ ounces of cheese. · Eat 6 to 8 servings of grains each day. A serving is 1 slice of bread, 1 ounce of dry cereal, or ½ cup of cooked rice, pasta, or cooked cereal. Try to choose whole-grain products as much as possible. · Limit lean meat, poultry, and fish to 2 servings each day. A serving is 3 ounces, about the size of a deck of cards. · Eat 4 to 5 servings of nuts, seeds, and legumes (cooked dried beans, lentils, and split peas) each week. A serving is 1/3 cup of nuts, 2 tablespoons of seeds, or ½ cup of cooked beans or peas. · Limit fats and oils to 2 to 3 servings each day. A serving is 1 teaspoon of vegetable oil or 2 tablespoons of salad dressing. · Limit sweets and added sugars to 5 servings or less a week. A serving is 1 tablespoon jelly or jam, ½ cup sorbet, or 1 cup of lemonade. · Eat less than 2,300 milligrams (mg) of sodium a day. If you limit your sodium to 1,500 mg a day, you can lower your blood pressure even more. Tips for success · Start small. Do not try to make dramatic changes to your diet all at once. You might feel that you are missing out on your favorite foods and then be more likely to not follow the plan. Make small changes, and stick with them. Once those changes become habit, add a few more changes. · Try some of the following: ¨ Make it a goal to eat a fruit or vegetable at every meal and at snacks. This will make it easy to get the recommended amount of fruits and vegetables each day. ¨ Try yogurt topped with fruit and nuts for a snack or healthy dessert. ¨ Add lettuce, tomato, cucumber, and onion to sandwiches. ¨ Combine a ready-made pizza crust with low-fat mozzarella cheese and lots of vegetable toppings.  Try using tomatoes, squash, spinach, broccoli, carrots, cauliflower, and onions. ¨ Have a variety of cut-up vegetables with a low-fat dip as an appetizer instead of chips and dip. ¨ Sprinkle sunflower seeds or chopped almonds over salads. Or try adding chopped walnuts or almonds to cooked vegetables. ¨ Try some vegetarian meals using beans and peas. Add garbanzo or kidney beans to salads. Make burritos and tacos with mashed lynn beans or black beans. Where can you learn more? Go to http://jaleesaWildfangsilvia.info/. Enter W270 in the search box to learn more about \"DASH Diet: Care Instructions. \" Current as of: September 21, 2016 Content Version: 11.4 © 4852-7318 [employer]. Care instructions adapted under license by [employer] (which disclaims liability or warranty for this information). If you have questions about a medical condition or this instruction, always ask your healthcare professional. [place] [address] any warranty or liability for your use of this information. Introducing [facility]! Dear Alicia Kruger: Thank you for requesting a Novaled account. Our records indicate that you already have an active Novaled account. You can access your account anytime at [email or web address] Did you know that you can access your hospital and ER discharge instructions at any time in Novaled? You can also review all of your test results from your hospital stay or ER visit. Additional Information If you have questions, please visit the Frequently Asked Questions section of the Novaled website at [email or web address] Remember, Novaled is NOT to be used for urgent needs. For medical emergencies, dial 911. Now available from your iPhone and Android! Please provide this summary of care documentation to your next provider. Your primary care clinician is listed as Phys Other.  If you have any questions after today's visit, please call [phone].

## 2018-10-03 ENCOUNTER — HOSPITAL ENCOUNTER (OUTPATIENT)
Dept: MAMMOGRAPHY | Age: 43
Discharge: HOME | End: 2018-10-03

## 2018-10-03 ENCOUNTER — HOSPITAL ENCOUNTER (OUTPATIENT)
Dept: LAB | Age: 43
Discharge: HOME | End: 2018-10-03

## 2018-10-03 ENCOUNTER — OFFICE VISIT (OUTPATIENT)
Dept: FAMILY PLANNING/WOMEN'S HEALTH CLINIC | Age: 43
End: 2018-10-03

## 2018-10-03 VITALS — DIASTOLIC BLOOD PRESSURE: 95 MMHG | SYSTOLIC BLOOD PRESSURE: 170 MMHG

## 2018-10-03 DIAGNOSIS — B96.89 BV (BACTERIAL VAGINOSIS): ICD-10-CM

## 2018-10-03 DIAGNOSIS — Z01.419 WELL WOMAN EXAM WITH ROUTINE GYNECOLOGICAL EXAM: Primary | ICD-10-CM

## 2018-10-03 DIAGNOSIS — N76.0 BV (BACTERIAL VAGINOSIS): ICD-10-CM

## 2018-10-03 DIAGNOSIS — Z12.31 VISIT FOR SCREENING MAMMOGRAM: ICD-10-CM

## 2018-10-03 PROCEDURE — 77067 SCR MAMMO BI INCL CAD: CPT

## 2018-10-03 PROCEDURE — 88175 CYTOPATH C/V AUTO FLUID REDO: CPT | Performed by: PHYSICIAN ASSISTANT

## 2018-10-03 RX ORDER — METRONIDAZOLE 500 MG/1
500 TABLET ORAL 2 TIMES DAILY
Qty: 14 TAB | Refills: 0 | Status: SHIPPED | OUTPATIENT
Start: 2018-10-03 | End: 2019-04-22

## 2018-10-03 NOTE — PROGRESS NOTES
Assessment/Plan:    Diagnoses and all orders for this visit:    1. Well woman exam with routine gynecological exam  -     PAP IG, RFX APTIMA HPV ASCUS ([id number]))    2. BV (bacterial vaginosis)  -     metroNIDAZOLE (FLAGYL) 500 mg tablet; Take 1 Tab by mouth two (2) times a day. Discussed BV with the pt. Discussed that she will need to avoid ETOH while on this medication, she agrees to this plan     Follow-up Disposition: Not on [address] [facility] VANESA Wilkins 8 expressed understanding of this plan. An AVS was printed and given to the patient.      ----------------------------------------------------------------------    Chief Complaint   Patient presents with    Well Woman     EWL visit       History of Present Illness:    Had BTL  Single, not sexually active  Concerned about a foul smelling discharge that she has been experiencing, no hx of BV  No risk of DV  Last pap 3 years ago            Past Medical History:   Diagnosis Date    Delivery normal     Hypertension        Current Outpatient Prescriptions   Medication Sig Dispense Refill    metroNIDAZOLE (FLAGYL) 500 mg tablet Take 1 Tab by mouth two (2) times a day. 14 Tab 0    amLODIPine (NORVASC) 10 mg tablet Take 1 Tab by mouth daily. 90 Tab 3    lisinopril-hydroCHLOROthiazide (PRINZIDE, ZESTORETIC) 20-25 mg per tablet Take 1 Tab by mouth daily. 90 Tab 3    Aspirin, Buffered 81 mg tab Take  by mouth. No Known Allergies    Social History   Substance Use Topics    Smoking status: Never Smoker    Smokeless tobacco: Never Used    Alcohol use No       Family History   Problem Relation Age of Onset    Stroke Mother        Physical Exam:     Visit Vitals    BP (!) 170/95 (BP 1 Location: Left arm)    LMP 2018 (Exact Date)       A&Ox3  WDWN NAD  Respirations normal and non labored  Breast exam- ext neg for mass, dimpling, skin color changes, retractions, nipple changes  Pelvic exam- ext neg for lesion or discharge.  Vagina there is a thin homogenous gray discharge with a fishy odor. Cervix w/out lesions.  Uterus and adnexal exam neg for mass or tenderness

## 2018-10-03 NOTE — PROGRESS NOTES
EVERY WOMANS LIFE HISTORY QUESTIONNAIRE       No Yes Comments   Has a doctor ever seen or felt anything wrong with your breast? [x]                                  []                                     Have you ever had a breast biopsy? [x]                                  []                                          When and where was last mammogram performed? Here 2017    Have you ever been told that there was a problem on your mammogram?   No Yes Comments   [x]                                  []                                       Do you have breast implants? No Yes Comments   [x]                                  []                                       When was your last Pap test performed? About 3 years ago    Have you ever had an abnormal Pap test?   No Yes Comments   [x]                                  []                                       Have you had a hysterectomy? No Yes Comments (why)   [x]                                  []                                       Have you ever been diagnosed with any type of Cancer   No Yes Comments (type,when,where,type of treatment   [x]                                  []                                          Has a family member been diagnosed with breast or ovarian cancer? No Yes Comments (which family members, and type   [x]                                  []                                       Did your mother take ELTON? No Yes Unknown   []                                  []                                  X     Do you have a history of HIV exposure? No Yes    [x]                                  []                                         Have you been through menopause?    No Yes Date of LMP   [x]                                  []                                       Are you taking hormone replacement therapy (HRT)     No Yes Comments   [x]                                  []                                       How many times have you been pregnant? 3     Number of live births ? 3    Are you experiencing any of the following? No Yes Comments   Nipple Discharge [x]                                  []                                     Breast Lump/Masses [x]                                  []                                  Pain left breast underside   Breast Skin Changes [x]                                  []                                          No Yes Comments   Vaginal Discharge [x]                                  []                                     Abnormal/unusual vaginal bleeding [x]                                  []                                         Are you experiencing any other health problems?  HTN

## 2018-10-11 ENCOUNTER — DOCUMENTATION ONLY (OUTPATIENT)
Dept: FAMILY PLANNING/WOMEN'S HEALTH CLINIC | Age: 43
End: 2018-10-11

## 2018-10-11 NOTE — PROGRESS NOTES
Spoke to patient and told her that her pap result from 10/3/18 showed no signs of cervical cancer but did show:    Heavy inflammatory exudate present and  Shift in dionne suggestive of bacterial vaginosis    Due to the heavy inflammatory exudate, I told her she should plan to have her next pap in 1 year.

## 2019-04-22 ENCOUNTER — HOSPITAL ENCOUNTER (OUTPATIENT)
Dept: LAB | Age: 44
Discharge: HOME | End: 2019-04-22

## 2019-04-22 ENCOUNTER — OFFICE VISIT (OUTPATIENT)
Dept: FAMILY MEDICINE CLINIC | Age: 44
End: 2019-04-22

## 2019-04-22 VITALS
TEMPERATURE: 98.5 F | HEIGHT: 63 IN | DIASTOLIC BLOOD PRESSURE: 114 MMHG | HEART RATE: 74 BPM | BODY MASS INDEX: 30.12 KG/M2 | WEIGHT: 170 LBS | SYSTOLIC BLOOD PRESSURE: 189 MMHG

## 2019-04-22 DIAGNOSIS — I10 ESSENTIAL HYPERTENSION: ICD-10-CM

## 2019-04-22 DIAGNOSIS — R53.83 FATIGUE, UNSPECIFIED TYPE: ICD-10-CM

## 2019-04-22 DIAGNOSIS — R53.83 FATIGUE, UNSPECIFIED TYPE: Primary | ICD-10-CM

## 2019-04-22 LAB
ALBUMIN SERPL-MCNC: 3.9 G/DL (ref 3.5–5)
ALBUMIN/GLOB SERPL: 1 {RATIO} (ref 1.1–2.2)
ALP SERPL-CCNC: 93 U/L (ref 45–117)
ALT SERPL-CCNC: 31 U/L (ref 12–78)
ANION GAP SERPL CALC-SCNC: 4 MMOL/L (ref 5–15)
AST SERPL-CCNC: 22 U/L (ref 15–37)
BASOPHILS # BLD: 0 K/UL (ref 0–0.1)
BASOPHILS NFR BLD: 0 % (ref 0–1)
BILIRUB SERPL-MCNC: 0.4 MG/DL (ref 0.2–1)
BUN SERPL-MCNC: 7 MG/DL (ref 6–20)
BUN/CREAT SERPL: 9 (ref 12–20)
CALCIUM SERPL-MCNC: 9 MG/DL (ref 8.5–10.1)
CHLORIDE SERPL-SCNC: 105 MMOL/L (ref 97–108)
CHOLEST SERPL-MCNC: 177 MG/DL
CO2 SERPL-SCNC: 29 MMOL/L (ref 21–32)
CREAT SERPL-MCNC: 0.77 MG/DL (ref 0.55–1.02)
DIFFERENTIAL METHOD BLD: NORMAL
EOSINOPHIL # BLD: 0 K/UL (ref 0–0.4)
EOSINOPHIL NFR BLD: 1 % (ref 0–7)
ERYTHROCYTE [DISTWIDTH] IN BLOOD BY AUTOMATED COUNT: 14 % (ref 11.5–14.5)
GLOBULIN SER CALC-MCNC: 3.8 G/DL (ref 2–4)
GLUCOSE SERPL-MCNC: 81 MG/DL (ref 65–100)
HCT VFR BLD AUTO: 39.3 % (ref 35–47)
HDLC SERPL-MCNC: 63 MG/DL
HDLC SERPL: 2.8 {RATIO} (ref 0–5)
HGB BLD-MCNC: 13 G/DL (ref 11.5–16)
IMM GRANULOCYTES # BLD AUTO: 0 K/UL (ref 0–0.04)
IMM GRANULOCYTES NFR BLD AUTO: 0 % (ref 0–0.5)
LDLC SERPL CALC-MCNC: 100.6 MG/DL (ref 0–100)
LIPID PROFILE,FLP: ABNORMAL
LYMPHOCYTES # BLD: 2.2 K/UL (ref 0.8–3.5)
LYMPHOCYTES NFR BLD: 36 % (ref 12–49)
MCH RBC QN AUTO: 29.5 PG (ref 26–34)
MCHC RBC AUTO-ENTMCNC: 33.1 G/DL (ref 30–36.5)
MCV RBC AUTO: 89.3 FL (ref 80–99)
MONOCYTES # BLD: 0.6 K/UL (ref 0–1)
MONOCYTES NFR BLD: 10 % (ref 5–13)
NEUTS SEG # BLD: 3.2 K/UL (ref 1.8–8)
NEUTS SEG NFR BLD: 53 % (ref 32–75)
NRBC # BLD: 0 K/UL (ref 0–0.01)
NRBC BLD-RTO: 0 PER 100 WBC
PLATELET # BLD AUTO: 235 K/UL (ref 150–400)
PMV BLD AUTO: 11.9 FL (ref 8.9–12.9)
POTASSIUM SERPL-SCNC: 4 MMOL/L (ref 3.5–5.1)
PROT SERPL-MCNC: 7.7 G/DL (ref 6.4–8.2)
RBC # BLD AUTO: 4.4 M/UL (ref 3.8–5.2)
SODIUM SERPL-SCNC: 138 MMOL/L (ref 136–145)
TRIGL SERPL-MCNC: 67 MG/DL (ref ?–150)
TSH SERPL DL<=0.05 MIU/L-ACNC: 1.21 UIU/ML (ref 0.36–3.74)
VLDLC SERPL CALC-MCNC: 13.4 MG/DL
WBC # BLD AUTO: 6 K/UL (ref 3.6–11)

## 2019-04-22 PROCEDURE — 83036 HEMOGLOBIN GLYCOSYLATED A1C: CPT

## 2019-04-22 PROCEDURE — 85025 COMPLETE CBC W/AUTO DIFF WBC: CPT

## 2019-04-22 PROCEDURE — 82306 VITAMIN D 25 HYDROXY: CPT

## 2019-04-22 PROCEDURE — 80061 LIPID PANEL: CPT

## 2019-04-22 PROCEDURE — 80053 COMPREHEN METABOLIC PANEL: CPT

## 2019-04-22 PROCEDURE — 84443 ASSAY THYROID STIM HORMONE: CPT

## 2019-04-22 RX ORDER — AMLODIPINE BESYLATE 10 MG/1
10 TABLET ORAL DAILY
Qty: 90 TAB | Refills: 3 | Status: SHIPPED | OUTPATIENT
Start: 2019-04-22 | End: 2020-01-27 | Stop reason: SDUPTHER

## 2019-04-22 RX ORDER — LISINOPRIL AND HYDROCHLOROTHIAZIDE 20; 25 MG/1; MG/1
1 TABLET ORAL DAILY
Qty: 90 TAB | Refills: 3 | Status: SHIPPED | OUTPATIENT
Start: 2019-04-22 | End: 2019-10-28

## 2019-04-22 NOTE — PROGRESS NOTES
HISTORY OF PRESENT ILLNESS Armond Adams is a 37 y.o. female. HPI Patient states she has been feeling very tired. She gets rest but still feels tired. For the last week has had some sharp pains to the temple. Has not been able to fill out her medications for about 1 month. Appetite is normal. 
No nausea, no vomiting, no diarrhea, no constipation. Sometimes feels very hot as if she is going to faint but the sensation goes away. Review of Systems Constitutional: Positive for malaise/fatigue. HENT: Negative for congestion, hearing loss, sinus pain and sore throat. Respiratory: Negative for cough, shortness of breath and wheezing. Cardiovascular: Negative for chest pain and palpitations. Gastrointestinal: Negative for abdominal pain, constipation, diarrhea, heartburn, nausea and vomiting. Genitourinary: Negative for dysuria, frequency and urgency. Neurological: Positive for headaches. BP (!) 189/114 (BP 1 Location: Right arm, BP Patient Position: Sitting)   Pulse 74   Temp 98.5 °F (36.9 °C) (Oral)   Ht 5' 3.19\" (1.605 m)   Wt 170 lb (77.1 kg)   LMP 04/21/2019   BMI 29.93 kg/m² Physical Exam  
Constitutional: She is oriented to person, place, and time. She appears well-developed and well-nourished. HENT:  
Head: Normocephalic. Right Ear: External ear normal.  
Left Ear: External ear normal.  
Eyes: Pupils are equal, round, and reactive to light. Conjunctivae and EOM are normal.  
Neck: Normal range of motion. No tracheal deviation present. No thyromegaly present. Cardiovascular: Normal rate, regular rhythm and normal heart sounds. No murmur heard. Pulmonary/Chest: Effort normal and breath sounds normal. No respiratory distress. She has no wheezes. Abdominal: Soft. She exhibits no distension. There is no tenderness. Musculoskeletal: Normal range of motion. She exhibits no edema. Neurological: She is alert and oriented to person, place, and time.  She has normal reflexes. Skin: Skin is warm. No erythema. ASSESSMENT and PLAN Diagnoses and all orders for this visit: 
 
1. Fatigue, unspecified type 
-     TSH 3RD GENERATION; Future 
-     HEMOGLOBIN A1C WITH EAG; Future -     VITAMIN D, 25 HYDROXY; Future 2. Essential hypertension 
-     lisinopril-hydroCHLOROthiazide (PRINZIDE, ZESTORETIC) 20-25 mg per tablet; Take 1 Tab by mouth daily. -     amLODIPine (NORVASC) 10 mg tablet; Take 1 Tab by mouth daily. 
-     LIPID PANEL; Future -     METABOLIC PANEL, COMPREHENSIVE; Future -     CBC WITH AUTOMATED DIFF; Future Follow up next clinic day in [facility]

## 2019-04-22 NOTE — PROGRESS NOTES
Coordination of Care 1. Have you been to the ER, urgent care clinic since your last visit? Hospitalized since your last visit? Yes When: 4/2019 [facility] ER for High BP 2. Have you seen or consulted any other health care providers outside of the [address] [place] [address] since your last visit? Include any pap smears or colon screening. Yes Does the patient need refills? YES Learning Assessment Complete? yes

## 2019-04-22 NOTE — PROGRESS NOTES
AVS printed and reviewed. Good Rx coupons are not available for [address] [place] [address].  Instructed to call [facility] office nurse if she wants to change pharmacy re cost.

## 2019-04-23 LAB
25(OH)D3 SERPL-MCNC: 12.4 NG/ML (ref 30–100)
EST. AVERAGE GLUCOSE BLD GHB EST-MCNC: 134 MG/DL
HBA1C MFR BLD: 6.3 % (ref 4.2–6.3)

## 2019-04-25 ENCOUNTER — TELEPHONE (OUTPATIENT)
Dept: FAMILY MEDICINE CLINIC | Age: 44
End: 2019-04-25

## 2019-04-25 RX ORDER — ERGOCALCIFEROL 1.25 MG/1
50000 CAPSULE ORAL
Qty: 12 CAP | Refills: 0 | Status: SHIPPED | OUTPATIENT
Start: 2019-04-25 | End: 2021-01-28 | Stop reason: SDUPTHER

## 2019-04-25 NOTE — PROGRESS NOTES
Vitamin D is very low. Vitamin D prescription to take once a week sent to pharmacy  Normal kidney function liver function electrolytes, choelsterol and thyroid  Hemoglobin a1c is 6.3% which means high risk to develop diabetes. Needs strict diet avoid sugar, sweets bread rice pasta, soft drinks.   Return for follow up in 3 months

## 2019-04-26 ENCOUNTER — TELEPHONE (OUTPATIENT)
Dept: FAMILY MEDICINE CLINIC | Age: 44
End: 2019-04-26

## 2019-04-26 NOTE — TELEPHONE ENCOUNTER
Pt left message asking for her lab results. RN returned call and related Dr. Remberto Hobbs message below regarding lab results:      Vitamin D is very low. Vitamin D prescription to take once a week sent to pharmacy  Normal kidney function liver function electrolytes, choelsterol and thyroid  Hemoglobin a1c is 6.3% which means high risk to develop diabetes. Needs strict diet avoid sugar, sweets bread rice pasta, soft drinks. Return for follow up in 3 months. Pt expressed understanding of the above information. Devon Cowan.

## 2019-07-22 ENCOUNTER — OFFICE VISIT (OUTPATIENT)
Dept: FAMILY MEDICINE CLINIC | Age: 44
End: 2019-07-22

## 2019-07-22 ENCOUNTER — HOSPITAL ENCOUNTER (OUTPATIENT)
Dept: LAB | Age: 44
Discharge: HOME | End: 2019-07-22

## 2019-07-22 VITALS
WEIGHT: 169 LBS | TEMPERATURE: 98.7 F | BODY MASS INDEX: 29.76 KG/M2 | SYSTOLIC BLOOD PRESSURE: 129 MMHG | HEART RATE: 72 BPM | DIASTOLIC BLOOD PRESSURE: 82 MMHG

## 2019-07-22 DIAGNOSIS — R73.03 PREDIABETES: ICD-10-CM

## 2019-07-22 DIAGNOSIS — I10 ESSENTIAL HYPERTENSION: ICD-10-CM

## 2019-07-22 DIAGNOSIS — I10 ESSENTIAL HYPERTENSION: Primary | ICD-10-CM

## 2019-07-22 LAB
ANION GAP SERPL CALC-SCNC: 7 MMOL/L (ref 5–15)
BUN SERPL-MCNC: 9 MG/DL (ref 6–20)
BUN/CREAT SERPL: 12 (ref 12–20)
CALCIUM SERPL-MCNC: 9.2 MG/DL (ref 8.5–10.1)
CHLORIDE SERPL-SCNC: 104 MMOL/L (ref 97–108)
CO2 SERPL-SCNC: 26 MMOL/L (ref 21–32)
CREAT SERPL-MCNC: 0.78 MG/DL (ref 0.55–1.02)
EST. AVERAGE GLUCOSE BLD GHB EST-MCNC: 134 MG/DL
GLUCOSE SERPL-MCNC: 79 MG/DL (ref 65–100)
HBA1C MFR BLD: 6.3 % (ref 4.2–6.3)
POTASSIUM SERPL-SCNC: 3.8 MMOL/L (ref 3.5–5.1)
SODIUM SERPL-SCNC: 137 MMOL/L (ref 136–145)

## 2019-07-22 PROCEDURE — 80048 BASIC METABOLIC PNL TOTAL CA: CPT

## 2019-07-22 PROCEDURE — 83036 HEMOGLOBIN GLYCOSYLATED A1C: CPT

## 2019-07-22 NOTE — PROGRESS NOTES
Coordination of Care  1. Have you been to the ER, urgent care clinic since your last visit? Hospitalized since your last visit? No    2. Have you seen or consulted any other health care providers outside of the [address] [place] [address] since your last visit? Include any pap smears or colon screening. No    Does the patient need refills? YES    Learning Assessment Complete?  yes

## 2019-07-22 NOTE — PROGRESS NOTES
HISTORY OF PRESENT ILLNESS  Jacinto Mclain is a 37 y.o. female. HPI  Patient states she is doing well. States last month She went to the hospital and they found out her potassium was low. She became sick all of the sudden. Does not need medication refill. Review of Systems   Constitutional: Negative for malaise/fatigue. HENT: Negative for congestion, hearing loss, sinus pain and sore throat. Respiratory: Negative for cough, shortness of breath and wheezing. Cardiovascular: Negative for chest pain and palpitations. Gastrointestinal: Negative for abdominal pain, constipation, diarrhea, heartburn, nausea and vomiting. Genitourinary: Negative for dysuria, frequency and urgency. Neurological: Negative for headaches. /82 (BP 1 Location: Left arm, BP Patient Position: Sitting)   Pulse 72   Temp 98.7 °F (37.1 °C) (Oral)   Wt 169 lb (76.7 kg)   LMP 06/24/2019   BMI 29.76 kg/m²   Physical Exam   Constitutional: She is oriented to person, place, and time. She appears well-developed and well-nourished. HENT:   Head: Normocephalic. Right Ear: External ear normal.   Left Ear: External ear normal.   Eyes: Pupils are equal, round, and reactive to light. Conjunctivae and EOM are normal.   Neck: Normal range of motion. No tracheal deviation present. No thyromegaly present. Cardiovascular: Normal rate, regular rhythm and normal heart sounds. No murmur heard. Pulmonary/Chest: Effort normal and breath sounds normal. No respiratory distress. She has no wheezes. Abdominal: Soft. She exhibits no distension. There is no tenderness. Musculoskeletal: Normal range of motion. She exhibits no edema. Neurological: She is alert and oriented to person, place, and time. She has normal reflexes. Skin: Skin is warm. No erythema. ASSESSMENT and PLAN  Diagnoses and all orders for this visit:    1. Essential hypertension  -     METABOLIC PANEL, BASIC; Future    2.  Prediabetes  -     HEMOGLOBIN A1C WITH EAG;  Future      Blood pressure at target  Will check labs today  Follow up in 3 months

## 2019-07-24 NOTE — PROGRESS NOTES
Hemoglobin A1c remains the same at 6.3% has not gotten worse.   Normal kidney function sugar levels and electrolytes    I have sent the results in the patient portal

## 2019-10-28 ENCOUNTER — OFFICE VISIT (OUTPATIENT)
Dept: FAMILY MEDICINE CLINIC | Age: 44
End: 2019-10-28

## 2019-10-28 ENCOUNTER — HOSPITAL ENCOUNTER (OUTPATIENT)
Dept: LAB | Age: 44
Discharge: HOME | End: 2019-10-28

## 2019-10-28 VITALS
BODY MASS INDEX: 29.95 KG/M2 | WEIGHT: 169 LBS | HEIGHT: 63 IN | SYSTOLIC BLOOD PRESSURE: 135 MMHG | HEART RATE: 79 BPM | TEMPERATURE: 98.4 F | DIASTOLIC BLOOD PRESSURE: 88 MMHG

## 2019-10-28 DIAGNOSIS — Z23 ENCOUNTER FOR IMMUNIZATION: ICD-10-CM

## 2019-10-28 DIAGNOSIS — R73.03 PREDIABETES: ICD-10-CM

## 2019-10-28 DIAGNOSIS — I10 ESSENTIAL HYPERTENSION: ICD-10-CM

## 2019-10-28 DIAGNOSIS — R42 DIZZINESS: Primary | ICD-10-CM

## 2019-10-28 LAB
ALBUMIN SERPL-MCNC: 4 G/DL (ref 3.5–5)
ALBUMIN/GLOB SERPL: 1.1 {RATIO} (ref 1.1–2.2)
ALP SERPL-CCNC: 82 U/L (ref 45–117)
ALT SERPL-CCNC: 28 U/L (ref 12–78)
ANION GAP SERPL CALC-SCNC: 5 MMOL/L (ref 5–15)
AST SERPL-CCNC: 15 U/L (ref 15–37)
BILIRUB SERPL-MCNC: 0.3 MG/DL (ref 0.2–1)
BUN SERPL-MCNC: 10 MG/DL (ref 6–20)
BUN/CREAT SERPL: 13 (ref 12–20)
CALCIUM SERPL-MCNC: 9.1 MG/DL (ref 8.5–10.1)
CHLORIDE SERPL-SCNC: 109 MMOL/L (ref 97–108)
CO2 SERPL-SCNC: 26 MMOL/L (ref 21–32)
CREAT SERPL-MCNC: 0.78 MG/DL (ref 0.55–1.02)
EST. AVERAGE GLUCOSE BLD GHB EST-MCNC: 140 MG/DL
GLOBULIN SER CALC-MCNC: 3.7 G/DL (ref 2–4)
GLUCOSE SERPL-MCNC: 100 MG/DL (ref 65–100)
HBA1C MFR BLD: 6.5 % (ref 4.2–6.3)
POTASSIUM SERPL-SCNC: 3.8 MMOL/L (ref 3.5–5.1)
PROT SERPL-MCNC: 7.7 G/DL (ref 6.4–8.2)
SODIUM SERPL-SCNC: 140 MMOL/L (ref 136–145)

## 2019-10-28 PROCEDURE — 80053 COMPREHEN METABOLIC PANEL: CPT

## 2019-10-28 PROCEDURE — 83036 HEMOGLOBIN GLYCOSYLATED A1C: CPT

## 2019-10-28 RX ORDER — LISINOPRIL 40 MG/1
40 TABLET ORAL DAILY
Qty: 90 TAB | Refills: 3 | Status: SHIPPED | OUTPATIENT
Start: 2019-10-28 | End: 2021-01-28 | Stop reason: SDUPTHER

## 2019-10-28 NOTE — PROGRESS NOTES
HISTORY OF PRESENT ILLNESS  Barron Foster is a 40 y.o. female. HPI  Patient states she has been getting dizzy spells. Somedays she is standing up and gets dizzy. Has to wait 10-15 minutes before she can move. Has been compliant with her medications. No chest pains, no shortness of breath, no other concerns. Review of Systems   Constitutional: Negative for malaise/fatigue. HENT: Negative for congestion, hearing loss, sinus pain and sore throat. Respiratory: Negative for cough, shortness of breath and wheezing. Cardiovascular: Negative for chest pain and palpitations. Gastrointestinal: Negative for abdominal pain, constipation, diarrhea, heartburn, nausea and vomiting. Genitourinary: Negative for dysuria, frequency and urgency. Neurological: Positive for dizziness. Negative for headaches. /88 (BP 1 Location: Left arm, BP Patient Position: Sitting)   Pulse 79   Temp 98.4 °F (36.9 °C) (Oral)   Ht 5' 3.19\" (1.605 m)   Wt 169 lb (76.7 kg)   LMP 10/06/2019   BMI 29.76 kg/m²   Physical Exam   Constitutional: She is oriented to person, place, and time. She appears well-developed and well-nourished. HENT:   Head: Normocephalic. Right Ear: External ear normal.   Left Ear: External ear normal.   Eyes: Pupils are equal, round, and reactive to light. Conjunctivae and EOM are normal.   Neck: Normal range of motion. No tracheal deviation present. No thyromegaly present. Cardiovascular: Normal rate, regular rhythm and normal heart sounds. No murmur heard. Pulmonary/Chest: Effort normal and breath sounds normal. No respiratory distress. She has no wheezes. Abdominal: Soft. She exhibits no distension. There is no tenderness. Musculoskeletal: Normal range of motion. She exhibits no edema. Neurological: She is alert and oriented to person, place, and time. She has normal reflexes. Skin: Skin is warm. No erythema.        ASSESSMENT and PLAN  Diagnoses and all orders for this visit: 1. Dizziness    2. Essential hypertension  -     lisinopril (PRINIVIL, ZESTRIL) 40 mg tablet; Take 1 Tab by mouth daily.  -     METABOLIC PANEL, COMPREHENSIVE; Future    3. Prediabetes  -     HEMOGLOBIN A1C WITH EAG;  Future      Dizzy spells,  supsect it could be a side effect to HCTZ,  We will stop hctz,  Will order lisinopril 40 mg instead  Check cmp and a1c today  Flu vaccine today  Follow up in 3 months

## 2019-10-28 NOTE — PROGRESS NOTES
AVS printed and reviewed. Discussed stopping Lis-HCTZ and starting Lisinopril 40 mg take 1 tab daily. Good Rx coupons not available at her [address] [place] [address]. Requests flu vaccine; denies fever, egg allergy. Immunization given per protocol and recorded in [address] [place]. VIS information sheet given, explained possible S/E. Reviewed sx indicating need to be seen in ER. Pt had no adverse reaction at time of discharge.

## 2019-10-28 NOTE — PROGRESS NOTES
Coordination of Care  1. Have you been to the ER, urgent care clinic since your last visit? Hospitalized since your last visit? No    2. Have you seen or consulted any other health care providers outside of the [address] [place] [address] since your last visit? Include any pap smears or colon screening. No    Does the patient need refills? NO    Learning Assessment Complete?  yes  Depression Screening complete in the past 12 months? yes

## 2019-10-30 NOTE — PROGRESS NOTES
Normal kidney function, liver function, electrolytes   The test for diabetes has reached the limit of diabetes range at 6.5%. We would not add any medications.   It is time to adjust diet even more,  Reducing the amount of bread, rice, pasta, pizza, potatoes ingested, and avoid sweets, soft drinks, sodas  We will recheck during next visit    I have sent this message to the patient in [address]

## 2019-11-09 ENCOUNTER — HOSPITAL ENCOUNTER (OUTPATIENT)
Dept: MAMMOGRAPHY | Age: 44
Discharge: HOME | End: 2019-11-09
Attending: NURSE PRACTITIONER

## 2019-11-09 ENCOUNTER — OFFICE VISIT (OUTPATIENT)
Dept: FAMILY PLANNING/WOMEN'S HEALTH CLINIC | Age: 44
End: 2019-11-09

## 2019-11-09 ENCOUNTER — HOSPITAL ENCOUNTER (OUTPATIENT)
Dept: LAB | Age: 44
Discharge: HOME | End: 2019-11-09

## 2019-11-09 VITALS — DIASTOLIC BLOOD PRESSURE: 76 MMHG | SYSTOLIC BLOOD PRESSURE: 121 MMHG

## 2019-11-09 DIAGNOSIS — Z12.31 SCREENING MAMMOGRAM, ENCOUNTER FOR: Primary | ICD-10-CM

## 2019-11-09 DIAGNOSIS — Z12.31 SCREENING MAMMOGRAM, ENCOUNTER FOR: ICD-10-CM

## 2019-11-09 DIAGNOSIS — Z01.419 WELL WOMAN EXAM WITH ROUTINE GYNECOLOGICAL EXAM: ICD-10-CM

## 2019-11-09 PROCEDURE — 88175 CYTOPATH C/V AUTO FLUID REDO: CPT

## 2019-11-09 PROCEDURE — 77067 SCR MAMMO BI INCL CAD: CPT

## 2019-11-09 NOTE — PROGRESS NOTES
EVERY WOMANS LIFE HISTORY QUESTIONNAIRE       No Yes Comments   Has a doctor ever seen or felt anything wrong with your breast? [x]                                  []                                     Have you ever had a breast biopsy? [x]                                  []                                          When and where was last mammogram performed? 9/27/2018 [facility]    Have you ever been told that there was a problem on your mammogram?   No Yes Comments   [x]                                  []                                       Do you have breast implants? No Yes Comments   [x]                                  []                                       When was your last Pap test performed? 10/2018    Have you ever had an abnormal Pap test?   No Yes Comments   []                                  [x]                                  10/2018, inflammation     Have you had a hysterectomy? No Yes Comments (why)   [x]                                  []                                       Have you been through menopause? No Yes Date of LMP   [x]                                  []                                  10/28/2019     Did your mother take ELTON? No Yes Unknown   [x]                                  []                                       Do you have a history of HIV exposure? No Yes    [x]                                  []                                       Have you ever been diagnosed with any type of Cancer   No Yes Comments (type,when,where,type of treatment   [x]                                  []                                          Has a family member been diagnosed with breast or ovarian cancer?    No Yes Comments (which family members, and type   [x]                                  []                                       Are you taking hormone replacement therapy (HRT)     No Yes Comments   [x]                                  [] How many times have you been pregnant? 3    Number of live births ? 3    Are you experiencing any of the following? No Yes Comments   Nipple Discharge [x]                                  []                                     Breast Lump/Masses [x]                                  []                                     Breast Skin Changes [x]                                  []                                          No Yes Comments   Vaginal Discharge [x]                                  []                                     Abnormal/unusual vaginal bleeding [x]                                  []                                         Are you experiencing any other health problems? Age at first period?  15  Age at first birth? 25    Ht--5'3\"     Wt--169lbs

## 2019-11-09 NOTE — PROGRESS NOTES
HISTORY OF PRESENT ILLNESS  Enos Simmonds is a 40 y.o. female here for EWL. HPI Ms. Waterman, , denies abnormal SBE's, performs monthly. Sometimes she gets left breast pain, \"shooting pain\" that resolves on its own. She denies abnormal vaginal discharge and bloating, no pelvic pain. Her periods have been closer together this year and had two short cycles in Oct. LMP 10/28/2019. H/O tubal ligation. Today she is having a 1 yr repeat Pap. Non-smoker. Review of Systems   Constitutional: Negative. Gastrointestinal: Negative for abdominal pain, blood in stool, constipation and diarrhea. Physical Exam   Constitutional: She is oriented to person, place, and time. She appears well-developed and well-nourished. Pulmonary/Chest: Right breast exhibits no inverted nipple, no mass, no nipple discharge, no skin change and no tenderness. Left breast exhibits no inverted nipple, no mass, no nipple discharge, no skin change and no tenderness. Breasts are symmetrical.   Genitourinary: Vagina normal and uterus normal. Pelvic exam was performed with patient supine. There is no rash, tenderness or lesion on the right labia. There is no rash, tenderness or lesion on the left labia. Cervix exhibits no motion tenderness, no discharge and no friability. Right adnexum displays no mass, no tenderness and no fullness. Left adnexum displays no mass, no tenderness and no fullness. No erythema, tenderness or bleeding in the vagina. No foreign body in the vagina. No signs of injury around the vagina. No vaginal discharge found. Lymphadenopathy:     She has no cervical adenopathy. She has no axillary adenopathy. Right: No inguinal and no supraclavicular adenopathy present. Left: No inguinal and no supraclavicular adenopathy present. Neurological: She is alert and oriented to person, place, and time. Skin: Skin is warm and dry. Psychiatric: She has a normal mood and affect.  Her behavior is normal. Thought content normal.   Nursing note and vitals reviewed. ASSESSMENT and PLAN  1. EWL  2. CBE benign  3. Screening mammogram today  4. Pap, Thin Prep obtained      -1 yr F/U d/t inflammation  5.  Perimenopause, LMP 10/28/2019

## 2020-01-27 ENCOUNTER — HOSPITAL ENCOUNTER (OUTPATIENT)
Dept: LAB | Age: 45
Discharge: HOME | End: 2020-01-27

## 2020-01-27 ENCOUNTER — OFFICE VISIT (OUTPATIENT)
Dept: FAMILY MEDICINE CLINIC | Age: 45
End: 2020-01-27

## 2020-01-27 VITALS
TEMPERATURE: 98.4 F | BODY MASS INDEX: 30.12 KG/M2 | HEIGHT: 63 IN | SYSTOLIC BLOOD PRESSURE: 145 MMHG | HEART RATE: 92 BPM | DIASTOLIC BLOOD PRESSURE: 96 MMHG | WEIGHT: 170 LBS

## 2020-01-27 DIAGNOSIS — I10 ESSENTIAL HYPERTENSION: ICD-10-CM

## 2020-01-27 DIAGNOSIS — J01.90 ACUTE NON-RECURRENT SINUSITIS, UNSPECIFIED LOCATION: ICD-10-CM

## 2020-01-27 DIAGNOSIS — R73.03 PREDIABETES: ICD-10-CM

## 2020-01-27 DIAGNOSIS — R73.03 PREDIABETES: Primary | ICD-10-CM

## 2020-01-27 LAB
ALBUMIN SERPL-MCNC: 4.2 G/DL (ref 3.5–5)
ALBUMIN/GLOB SERPL: 1 {RATIO} (ref 1.1–2.2)
ALP SERPL-CCNC: 89 U/L (ref 45–117)
ALT SERPL-CCNC: 37 U/L (ref 12–78)
ANION GAP SERPL CALC-SCNC: 7 MMOL/L (ref 5–15)
AST SERPL-CCNC: 24 U/L (ref 15–37)
BILIRUB SERPL-MCNC: 0.3 MG/DL (ref 0.2–1)
BUN SERPL-MCNC: 9 MG/DL (ref 6–20)
BUN/CREAT SERPL: 12 (ref 12–20)
CALCIUM SERPL-MCNC: 9.2 MG/DL (ref 8.5–10.1)
CHLORIDE SERPL-SCNC: 105 MMOL/L (ref 97–108)
CHOLEST SERPL-MCNC: 174 MG/DL
CO2 SERPL-SCNC: 26 MMOL/L (ref 21–32)
CREAT SERPL-MCNC: 0.77 MG/DL (ref 0.55–1.02)
EST. AVERAGE GLUCOSE BLD GHB EST-MCNC: 126 MG/DL
GLOBULIN SER CALC-MCNC: 4.1 G/DL (ref 2–4)
GLUCOSE POC: NORMAL MG/DL
GLUCOSE SERPL-MCNC: 94 MG/DL (ref 65–100)
HBA1C MFR BLD: 6 % (ref 4–5.6)
HDLC SERPL-MCNC: 65 MG/DL
HDLC SERPL: 2.7 {RATIO} (ref 0–5)
LDLC SERPL CALC-MCNC: 94.6 MG/DL (ref 0–100)
LIPID PROFILE,FLP: NORMAL
POTASSIUM SERPL-SCNC: 4 MMOL/L (ref 3.5–5.1)
PROT SERPL-MCNC: 8.3 G/DL (ref 6.4–8.2)
SODIUM SERPL-SCNC: 138 MMOL/L (ref 136–145)
TRIGL SERPL-MCNC: 72 MG/DL (ref ?–150)
VLDLC SERPL CALC-MCNC: 14.4 MG/DL

## 2020-01-27 PROCEDURE — 80053 COMPREHEN METABOLIC PANEL: CPT

## 2020-01-27 PROCEDURE — 83036 HEMOGLOBIN GLYCOSYLATED A1C: CPT

## 2020-01-27 PROCEDURE — 80061 LIPID PANEL: CPT

## 2020-01-27 RX ORDER — AMLODIPINE BESYLATE 10 MG/1
10 TABLET ORAL DAILY
Qty: 90 TAB | Refills: 3 | Status: SHIPPED | OUTPATIENT
Start: 2020-01-27 | End: 2021-01-28 | Stop reason: SDUPTHER

## 2020-01-27 RX ORDER — AMOXICILLIN 500 MG/1
500 CAPSULE ORAL 3 TIMES DAILY
Qty: 30 CAP | Refills: 0 | Status: SHIPPED | OUTPATIENT
Start: 2020-01-27 | End: 2020-02-06

## 2020-01-27 NOTE — PROGRESS NOTES
HISTORY OF PRESENT ILLNESS  Sanjiv Valdez is a 40 y.o. female. HPI  Patient states she has been having a cold that has gotten worse over the past 2 days, feels headaches and sinus congestion. Review of Systems   Constitutional: Negative for malaise/fatigue. HENT: Positive for congestion and sinus pain. Negative for hearing loss and sore throat. Respiratory: Negative for cough, shortness of breath and wheezing. Cardiovascular: Negative for chest pain and palpitations. Gastrointestinal: Negative for abdominal pain, constipation, diarrhea, heartburn, nausea and vomiting. Genitourinary: Negative for dysuria, frequency and urgency. Neurological: Negative for dizziness and headaches. BP (!) 145/96 (BP 1 Location: Right arm, BP Patient Position: Sitting)   Pulse 92   Temp 98.4 °F (36.9 °C) (Oral)   Ht 5' 3.19\" (1.605 m)   Wt 170 lb (77.1 kg)   LMP 01/06/2020   BMI 29.93 kg/m²   Physical Exam  Constitutional:       Appearance: She is well-developed. HENT:      Head: Normocephalic. Right Ear: External ear normal.      Left Ear: External ear normal.      Nose: Congestion and rhinorrhea present. Eyes:      Conjunctiva/sclera: Conjunctivae normal.      Pupils: Pupils are equal, round, and reactive to light. Neck:      Musculoskeletal: Normal range of motion. Thyroid: No thyromegaly. Trachea: No tracheal deviation. Cardiovascular:      Rate and Rhythm: Normal rate and regular rhythm. Heart sounds: Normal heart sounds. No murmur. Pulmonary:      Effort: Pulmonary effort is normal. No respiratory distress. Breath sounds: Normal breath sounds. No wheezing. Abdominal:      General: There is no distension. Palpations: Abdomen is soft. Tenderness: There is no tenderness. Musculoskeletal: Normal range of motion. Skin:     General: Skin is warm. Findings: No erythema.    Neurological:      Mental Status: She is alert and oriented to person, place, and time.      Deep Tendon Reflexes: Reflexes are normal and symmetric. ASSESSMENT and PLAN  Diagnoses and all orders for this visit:    1. Prediabetes  -     AMB POC GLUCOSE BLOOD, BY GLUCOSE MONITORING DEVICE  -     LIPID PANEL; Future  -     METABOLIC PANEL, COMPREHENSIVE; Future  -     HEMOGLOBIN A1C WITH EAG; Future    2. Acute non-recurrent sinusitis, unspecified location  -     amoxicillin (AMOXIL) 500 mg capsule; Take 1 Cap by mouth three (3) times daily for 10 days. 3. Essential hypertension  -     amLODIPine (NORVASC) 10 mg tablet; Take 1 Tab by mouth daily.       We will check labs today  Patient seem to be having acute sinusitis, we will write for amoxicillin  Amlodipine refilled  Follow up in 6 months

## 2020-01-27 NOTE — PROGRESS NOTES
Check-out Note: Good rx   labs       Reviewed AVS, prescription and pharmacy location with patient. AVS printed and given, no goodrx coupons available for pharmacy/RX. Patient aware that provider would like to follow up about today's visit in 6 months with an appt. . This has been fully explained to the patient, who indicates understanding and agrees with plan. No further questions at this time.  Ana Laura Genao RN

## 2020-01-27 NOTE — PROGRESS NOTES
Coordination of Care  1. Have you been to the ER, urgent care clinic since your last visit? Hospitalized since your last visit? No    2. Have you seen or consulted any other health care providers outside of the [address] [place] [address] since your last visit? Include any pap smears or colon screening. No    Does the patient need refills? YES    Learning Assessment Complete?  yes  Depression Screening complete in the past 12 months? yes  Results for orders placed or performed in visit on 01/27/20   AMB POC GLUCOSE BLOOD, BY GLUCOSE MONITORING DEVICE   Result Value Ref Range    Glucose POC fasting 106 mg/dL

## 2020-01-29 NOTE — PROGRESS NOTES
Diabetes test came back in prediabetes range at 6% which is better than last test  Normal kidney function, liver function, cholesterol and electrolytes  We can follow up in 6 months  This message was sent to the patient via [employer]

## 2020-02-05 ENCOUNTER — DOCUMENTATION ONLY (OUTPATIENT)
Dept: FAMILY MEDICINE CLINIC | Age: 45
End: 2020-02-05

## 2020-04-02 ENCOUNTER — PATIENT MESSAGE (OUTPATIENT)
Dept: FAMILY MEDICINE CLINIC | Age: 45
End: 2020-04-02

## 2020-04-07 ENCOUNTER — PATIENT MESSAGE (OUTPATIENT)
Dept: FAMILY PLANNING/WOMEN'S HEALTH CLINIC | Age: 45
End: 2020-04-07

## 2020-05-04 ENCOUNTER — TELEPHONE (OUTPATIENT)
Dept: FAMILY MEDICINE CLINIC | Age: 45
End: 2020-05-04

## 2020-05-05 ENCOUNTER — OFFICE VISIT (OUTPATIENT)
Dept: FAMILY MEDICINE CLINIC | Age: 45
End: 2020-05-05

## 2020-05-05 ENCOUNTER — TELEPHONE (OUTPATIENT)
Dept: FAMILY MEDICINE CLINIC | Age: 45
End: 2020-05-05

## 2020-05-05 VITALS — BODY MASS INDEX: 30.12 KG/M2 | WEIGHT: 170 LBS | HEIGHT: 63 IN

## 2020-05-05 DIAGNOSIS — N92.1 MENORRHAGIA WITH IRREGULAR CYCLE: Primary | ICD-10-CM

## 2020-05-05 NOTE — PROGRESS NOTES
No discharge nurse needed per provider, nurse only needs to send message to registration to schedule patient for labs next week; message has been sent to registration.  Bridger Galvan RN

## 2020-05-05 NOTE — TELEPHONE ENCOUNTER
Tc to the pt. 2x. The pt answered the second call. The pt was told the provider had said she would need an appt. The pt was given the appt line # to call and protocol to have an appointment scheduled. The pt verbalized understanding.  Gerri Faye RN

## 2020-05-05 NOTE — PROGRESS NOTES
Coordination of Care  1. Have you been to the ER, urgent care clinic since your last visit? Hospitalized since your last visit? No    2. Have you seen or consulted any other health care providers outside of the [address] [place] [address] since your last visit? Include any pap smears or colon screening. No    Does the patient need refills? NO    Learning Assessment Complete?  yes  Depression Screening complete in the past 12 months? yes

## 2020-05-05 NOTE — TELEPHONE ENCOUNTER
----- Message from Yazmin Kessler sent at 5/5/2020  4:02 PM EDT -----  Regarding: RE: f/up  Patient refused appointment at this time due to transportation. She will give us a call when she is ready to make appointment.  ----- Message -----  From: Vin Araya RN  Sent: 5/5/2020   3:51 PM EDT  To: Yazmin Kessler, [address] [facility] Office  Subject: f/up                                             Patient needs lab appt next week.      Thanks  Dioni Saavedra RN

## 2020-05-11 ENCOUNTER — DOCUMENTATION ONLY (OUTPATIENT)
Dept: FAMILY MEDICINE CLINIC | Age: 45
End: 2020-05-11

## 2020-05-12 ENCOUNTER — LAB ONLY (OUTPATIENT)
Dept: FAMILY MEDICINE CLINIC | Age: 45
End: 2020-05-12

## 2020-05-12 ENCOUNTER — HOSPITAL ENCOUNTER (OUTPATIENT)
Dept: LAB | Age: 45
Discharge: HOME | End: 2020-05-12

## 2020-05-12 DIAGNOSIS — N92.1 MENORRHAGIA WITH IRREGULAR CYCLE: ICD-10-CM

## 2020-05-12 PROCEDURE — 85025 COMPLETE CBC W/AUTO DIFF WBC: CPT

## 2020-05-12 NOTE — PROGRESS NOTES
Patient came in to the lab per Anand Rubi. CBC was drawn from R-AC without complications. Results pending.

## 2020-05-13 LAB
BASOPHILS # BLD: 0 K/UL (ref 0–0.1)
BASOPHILS NFR BLD: 0 % (ref 0–1)
DIFFERENTIAL METHOD BLD: NORMAL
EOSINOPHIL # BLD: 0 K/UL (ref 0–0.4)
EOSINOPHIL NFR BLD: 0 % (ref 0–7)
ERYTHROCYTE [DISTWIDTH] IN BLOOD BY AUTOMATED COUNT: 13 % (ref 11.5–14.5)
HCT VFR BLD AUTO: 38.5 % (ref 35–47)
HGB BLD-MCNC: 12.4 G/DL (ref 11.5–16)
IMM GRANULOCYTES # BLD AUTO: 0 K/UL (ref 0–0.04)
IMM GRANULOCYTES NFR BLD AUTO: 0 % (ref 0–0.5)
LYMPHOCYTES # BLD: 2.2 K/UL (ref 0.8–3.5)
LYMPHOCYTES NFR BLD: 29 % (ref 12–49)
MCH RBC QN AUTO: 27.9 PG (ref 26–34)
MCHC RBC AUTO-ENTMCNC: 32.2 G/DL (ref 30–36.5)
MCV RBC AUTO: 86.7 FL (ref 80–99)
MONOCYTES # BLD: 0.5 K/UL (ref 0–1)
MONOCYTES NFR BLD: 7 % (ref 5–13)
NEUTS SEG # BLD: 4.8 K/UL (ref 1.8–8)
NEUTS SEG NFR BLD: 64 % (ref 32–75)
NRBC # BLD: 0 K/UL (ref 0–0.01)
NRBC BLD-RTO: 0 PER 100 WBC
PLATELET # BLD AUTO: 250 K/UL (ref 150–400)
PMV BLD AUTO: 11.6 FL (ref 8.9–12.9)
RBC # BLD AUTO: 4.44 M/UL (ref 3.8–5.2)
WBC # BLD AUTO: 7.6 K/UL (ref 3.6–11)

## 2020-05-14 ENCOUNTER — OFFICE VISIT (OUTPATIENT)
Dept: FAMILY MEDICINE CLINIC | Age: 45
End: 2020-05-14

## 2020-05-14 DIAGNOSIS — Z71.89 COUNSELING AND COORDINATION OF CARE: Primary | ICD-10-CM

## 2020-05-14 NOTE — PROGRESS NOTES
(Telephone encounter due to closed clinics because of COVID19 situation). OW called pt and assisted her with ANow application. Pending POI and tax return. OW explained the process to pt and mailed her the documents that need to be signed with written instructions on what to do and where to send documents back to OW. Pt verbalized understanding.

## 2020-05-28 ENCOUNTER — TELEPHONE (OUTPATIENT)
Dept: FAMILY MEDICINE CLINIC | Age: 45
End: 2020-05-28

## 2020-05-28 ENCOUNTER — DOCUMENTATION ONLY (OUTPATIENT)
Dept: FAMILY MEDICINE CLINIC | Age: 45
End: 2020-05-28

## 2020-05-28 ENCOUNTER — OFFICE VISIT (OUTPATIENT)
Dept: FAMILY MEDICINE CLINIC | Age: 45
End: 2020-05-28

## 2020-05-28 DIAGNOSIS — Z71.89 COUNSELING AND COORDINATION OF CARE: Primary | ICD-10-CM

## 2020-05-28 NOTE — TELEPHONE ENCOUNTER
Tc to the pt. She had sent a [employer] msg regarding her health problem of having periods 2 x a month, and was asking what should she do. The pt has been referred to AN GYN. The pt was called and reminded that she was referred to the AN Gyn for this problem. The pt was asked if she was having ay severe bleeding or severe abdominal pain, N/V, diarrhea. She stated she was not. She was not having her period at this point. She has had her period 2x this month but it has stopped now. The chart was reviewed the last OW note was stating the pending POI and tax return. The pt needed to sign some documents and mail them back to the [facility] OW. The pt was asked if she had received the documents and signed and sent back all requested documentation. The pt stated she had sent the papers back. The pt was told the OW would be contacted and ask to contact the pt and let her know what the AN application status was at this point. The pt verbalized understanding. The OW Dominique George was sent a message.  Lopez Fang RN

## 2020-05-28 NOTE — PATIENT INSTRUCTIONS
OW received signed application from pt but did not receive pending POI nor pending tax return form. OW called pt and could not speak with her. Left her a vm asking to call OW back.

## 2020-05-28 NOTE — PROGRESS NOTES
(Telephone encounter due to closed clinics because of COVID19 situation). OW called OW to f/up on missing documents (POI and tax return). OW spoke with patient and told her that she did not send her POI and tax return form. Patient said she was unable to get her tax form because she does not have a computer at home and all [address] are still closed due to [address]. Pt also said she tried to send her pay stubs to OW via e-mail and she was unable to do it. OW explained that OW will be working until today and another OW will take care of her application. OW provided office phone number and fax number and address and asked the pt to send her pending documents as soon as she can and aim the mail or fax to [employer]. Pt verbalized understanding.

## 2020-05-28 NOTE — TELEPHONE ENCOUNTER
----- Message from [facility] sent at 5/21/2020  1:31 PM EDT -----  Regarding: Non-Urgent Medical Question  Contact: [phone]  This is [facility]. I really want to know what can be done cause my menstrual was just on May 4th and Im spotting again trying to come on again.  Please tell me what can done

## 2020-11-14 ENCOUNTER — OFFICE VISIT (OUTPATIENT)
Dept: FAMILY PLANNING/WOMEN'S HEALTH CLINIC | Age: 45
End: 2020-11-14

## 2020-11-14 ENCOUNTER — HOSPITAL ENCOUNTER (OUTPATIENT)
Dept: MAMMOGRAPHY | Age: 45
Discharge: HOME | End: 2020-11-14
Attending: NURSE PRACTITIONER

## 2020-11-14 DIAGNOSIS — Z12.31 SCREENING MAMMOGRAM, ENCOUNTER FOR: ICD-10-CM

## 2020-11-14 DIAGNOSIS — Z12.31 SCREENING MAMMOGRAM, ENCOUNTER FOR: Primary | ICD-10-CM

## 2020-11-14 PROCEDURE — 77067 SCR MAMMO BI INCL CAD: CPT

## 2020-11-14 NOTE — PROGRESS NOTES
HISTORY OF PRESENT ILLNESS  Matthew Garcia is a 39 y.o. female here for EWL. HPI Ms. Waterman, , denies abnormal SBE's, performs monthly. She does get shooting pain in her breasts from time to time. She denies abnormal vaginal discharge, bloating and pelvic pain. LMP 2020. Some months she has two cycles but they have gotten much lighter. She denies use of hormones. She doesn't want a pelvic exam today. Her Pap was done in 2019- normal.   Non-smoker. Review of Systems   Constitutional: Negative. Gastrointestinal: Negative. Genitourinary: Negative. Physical Exam  Nursing note reviewed. Constitutional:       Appearance: Normal appearance. Chest:      Breasts:         Right: Normal. No swelling, bleeding, inverted nipple, mass, nipple discharge, skin change or tenderness. Left: Normal. No swelling, bleeding, inverted nipple, mass, nipple discharge, skin change or tenderness. Lymphadenopathy:      Upper Body:      Right upper body: No supraclavicular, axillary or pectoral adenopathy. Left upper body: No supraclavicular, axillary or pectoral adenopathy. Skin:     General: Skin is warm and dry. Neurological:      Mental Status: She is alert and oriented to person, place, and time. Psychiatric:         Mood and Affect: Mood normal.         Behavior: Behavior normal.         ASSESSMENT and PLAN  1. EWL  2. CBE benign  3. Screening mammogram today  4. Mastalgia     -recommend OTC vitamin E or evening primrose oil  5.  Perimenopause

## 2020-11-14 NOTE — PROGRESS NOTES
EVERY WOMANS LIFE HISTORY QUESTIONNAIRE       No Yes Comments   Has a doctor ever seen or felt anything wrong with your breast? [x]                                  []                                     Have you ever had a breast biopsy? [x]                                  []                                          When and where was last mammogram performed? 2019 [facility]    Have you ever been told that there was a problem on your mammogram?   No Yes Comments   [x]                                  []                                       Do you have breast implants? No Yes Comments   [x]                                  []                                       When was your last Pap test performed? 2019 [facility]    Have you ever had an abnormal Pap test?   No Yes Comments   [x]                                  []                                       Have you had a hysterectomy? No Yes Comments (why)   [x]                                  []                                       Have you been through menopause? No Yes Date of LMP   [x]                                  []                                       Did your mother take ELTON? No Yes Unknown   []                                  []                                  unknown     Do you have a history of HIV exposure? No Yes    [x]                                  []                                       Have you ever been diagnosed with any type of Cancer   No Yes Comments (type,when,where,type of treatment   [x]                                  []                                          Has a family member been diagnosed with breast or ovarian cancer?    No Yes Comments (which family members, and type   [x]                                  []                                       Are you taking hormone replacement therapy (HRT)     No Yes Comments   [x]                                  []                                       How many times have you been pregnant? 3     Number of live births ? 3    Are you experiencing any of the following? No Yes Comments   Nipple Discharge [x]                                  []                                     Breast Lump/Masses [x]                                  []                                     Breast Skin Changes [x]                                  []                                          No Yes Comments   Vaginal Discharge [x]                                  []                                     Abnormal/unusual vaginal bleeding [x]                                  []                                         Are you experiencing any other health problems? Age at first period?   15  Age at first birth? 25    Ht--5'3\"    Wt--165

## 2021-01-25 ENCOUNTER — TELEPHONE (OUTPATIENT)
Dept: FAMILY MEDICINE CLINIC | Age: 46
End: 2021-01-25

## 2021-01-25 NOTE — TELEPHONE ENCOUNTER
If she cannot fast until her appointment she may eat a low fat snack and we will measure non fasting lipids and account for her meal.  Agree with bringing ED notes if they did studies that were abnormal.

## 2021-01-25 NOTE — TELEPHONE ENCOUNTER
Tc to the pt. She would like to know if she should be fasting before her appt Thursday. The pt was told if she will be having labs drawn for Lipids yes. There are no future labs ordered at this time. The pt is probably due for labs its been 1 yr since her last Lipid panel. The pt was told he should be fasting 8-12 hours before the lipid panel is drawn. the pt stated her appt thurs is not until 2:30 pm. She does not know if she can fast til then. The pt was told if she needs fasting labs she may need to return with an early am appt for them at a later date. The pt is also wondering about some findings after visiting a [facility] ED a month ago. they thought it was her appendix, but found ovarian cysts. The pt was asking if she can be referred to a Specialist or what should she do. The pt was told she would have to come to her appt and discuss it with the provider. Then the provider can make any referrals she may need. The pt was encouraged to bring all papers from the ED visit to the F/U appt with her on Thurs.  Rula Castaneda RN

## 2021-01-25 NOTE — TELEPHONE ENCOUNTER
Suzy White 8  Patient has an F2F apt  And patient want to know if to do blood test that day can she eat before ? ? Patient will be waiting for a phone call .     Thank you   Mary Silva

## 2021-01-28 ENCOUNTER — OFFICE VISIT (OUTPATIENT)
Dept: FAMILY MEDICINE CLINIC | Age: 46
End: 2021-01-28

## 2021-01-28 ENCOUNTER — HOSPITAL ENCOUNTER (OUTPATIENT)
Dept: LAB | Age: 46
Discharge: HOME | End: 2021-01-28

## 2021-01-28 ENCOUNTER — TRANSCRIBE ORDER (OUTPATIENT)
Dept: SCHEDULING | Age: 46
End: 2021-01-28

## 2021-01-28 VITALS
WEIGHT: 166.4 LBS | HEIGHT: 63 IN | SYSTOLIC BLOOD PRESSURE: 147 MMHG | OXYGEN SATURATION: 98 % | DIASTOLIC BLOOD PRESSURE: 76 MMHG | TEMPERATURE: 98 F | HEART RATE: 80 BPM | BODY MASS INDEX: 29.48 KG/M2

## 2021-01-28 DIAGNOSIS — R10.2 PELVIC PAIN: Primary | ICD-10-CM

## 2021-01-28 DIAGNOSIS — N85.2 ENLARGED UTERUS: ICD-10-CM

## 2021-01-28 DIAGNOSIS — E55.9 VITAMIN D DEFICIENCY: ICD-10-CM

## 2021-01-28 DIAGNOSIS — R73.03 PREDIABETES: ICD-10-CM

## 2021-01-28 DIAGNOSIS — I10 ESSENTIAL HYPERTENSION: ICD-10-CM

## 2021-01-28 PROCEDURE — 80061 LIPID PANEL: CPT

## 2021-01-28 PROCEDURE — 80053 COMPREHEN METABOLIC PANEL: CPT

## 2021-01-28 PROCEDURE — 83036 HEMOGLOBIN GLYCOSYLATED A1C: CPT

## 2021-01-28 PROCEDURE — 84443 ASSAY THYROID STIM HORMONE: CPT

## 2021-01-28 PROCEDURE — 82306 VITAMIN D 25 HYDROXY: CPT

## 2021-01-28 PROCEDURE — 99214 OFFICE O/P EST MOD 30 MIN: CPT | Performed by: FAMILY MEDICINE

## 2021-01-28 RX ORDER — ERGOCALCIFEROL 1.25 MG/1
50000 CAPSULE ORAL
Qty: 12 CAP | Refills: 0 | Status: SHIPPED | OUTPATIENT
Start: 2021-01-28

## 2021-01-28 RX ORDER — LISINOPRIL 40 MG/1
40 TABLET ORAL DAILY
Qty: 90 TAB | Refills: 3 | Status: SHIPPED | OUTPATIENT
Start: 2021-01-28

## 2021-01-28 RX ORDER — AMLODIPINE BESYLATE 10 MG/1
10 TABLET ORAL DAILY
Qty: 90 TAB | Refills: 3 | Status: SHIPPED | OUTPATIENT
Start: 2021-01-28

## 2021-01-28 NOTE — PROGRESS NOTES
Coordination of Care  1. Have you been to the ER, urgent care clinic since your last visit? Hospitalized since your last visit? Yes When: 1/2/21/abdominal pain    2. Have you seen or consulted any other health care providers outside of the [address] [place] [address] since your last visit? Include any pap smears or colon screening. No    Does the patient need refills? YES    Learning Assessment Complete?  yes  Depression Screening complete in the past 12 months? yes

## 2021-01-28 NOTE — PROGRESS NOTES
Edson Edwards (: 1975) is a 39 y.o. female, established patient, here for evaluation of the following chief complaint(s):  ED Follow-up (abdominal pain) and Hypertension (F/U)       ASSESSMENT/PLAN:  1. Pelvic pain  -     US PELV NON OBS; Future  RLQ pain with hx of ovarian cysts and a negative CT for appendicitis. Will get pelvic US due to enlarged uterus and ovarian pain. 2. Enlarged uterus  -     US PELV NON OBS; Future  3. Essential hypertension  -     amLODIPine (NORVASC) 10 mg tablet; Take 1 Tab by mouth daily. , Normal, Disp-90 Tab, R-3  -     lisinopriL (PRINIVIL, ZESTRIL) 40 mg tablet; Take 1 Tab by mouth daily. , Normal, Disp-90 Tab, R-3  -     LIPID PANEL; Future  -     METABOLIC PANEL, COMPREHENSIVE; Future  -     TSH 3RD GENERATION; Future  Borderline control but she just restarted medications. Continue with current combination. 4. Prediabetes  -     HEMOGLOBIN A1C WITH EAG; Future  recheck  5. Vitamin D deficiency  -     VITAMIN D, 25 HYDROXY; Future  Resume supplement. Follow-up and Dispositions    · Return in about 6 months (around 2021) for follow up for F2F in 6 months for HTN. SUBJECTIVE/OBJECTIVE:  HPI  HTN:  Patient is taking Norvasc, Lisinopril regularly without any side effects. Had run out of medications but were renewed at ER earlier this month. Prediabetes:  Careful with diet. Has maintained weight. Ovarian Cysts:  RLQ pain 21, went to ER. Hx of menses 1-2 times per month. At times associated with severe cramps. CT shows ? ovarian cyst and enlarged uterus. Vitamin D deficiency:   Is not taking supplement regularly. Review of Systems  CONSTITUTIONAL:  Negative for chills, fever, weight change, malaise, night sweats  CARDIOVASCULAR:  Negative for palpitations, chest pain, syncope, dyspnea on exertion, leg edema  PULMONARY:  Negative for cough, shortness of breath, wheezing.   NEUROLOGICAL:  Was having headaches prior to restarting BP medications but these have resolved since being back on her medications. HEMATOLOGY:  Negative for bruising    Physical Exam  Blood pressure (!) 147/76, pulse 80, temperature 98 °F (36.7 °C), height 5' 2.72\" (1.593 m), weight 166 lb 6.4 oz (75.5 kg), last menstrual period 01/21/2021, SpO2 98 %. CONSTITUTIONAL:  Well developed. No apparent distress. PSYCHIATRIC: Oriented to time, place, person & situation. Appropriate mood and affect. NECK:  Normal inspection, normal palpation without any lymphadenopathy, masses, or thyromegaly  CARDIOVASCULAR:  Regular rate and rhythm. Normal S1, S2. No extra sounds. RESPIRATORY:  Normal effort. Normal ascultation without wheezing. ABDOMEN:  Normal bowel sounds. Abdomen soft, mild RLQ tenderness without rebound or guarding. No hepatosplenomegaly or masses. No results found for this visit on 01/28/21. An electronic signature was used to authenticate this note.   -- Jj Rodrigez MD

## 2021-01-28 NOTE — PROGRESS NOTES
Avs discussed with Ebony Dunbar by Discharge Nurse Nam Mckeon LPN. Discussed medications prescribed, good rx coupon given to pt. US scheduled 2/1@ [facility], Labslip completed, info where to get blood drawn given. Patient verbalized understanding and has no further questions.  AVS printed and given to patient Nam Mckeon LPN

## 2021-01-29 ENCOUNTER — TELEPHONE (OUTPATIENT)
Dept: FAMILY MEDICINE CLINIC | Age: 46
End: 2021-01-29

## 2021-01-29 LAB
25(OH)D3 SERPL-MCNC: 14.2 NG/ML (ref 30–100)
ALBUMIN SERPL-MCNC: 4.3 G/DL (ref 3.5–5)
ALBUMIN/GLOB SERPL: 1.1 {RATIO} (ref 1.1–2.2)
ALP SERPL-CCNC: 109 U/L (ref 45–117)
ALT SERPL-CCNC: 26 U/L (ref 12–78)
ANION GAP SERPL CALC-SCNC: 5 MMOL/L (ref 5–15)
AST SERPL-CCNC: 13 U/L (ref 15–37)
BILIRUB SERPL-MCNC: 0.3 MG/DL (ref 0.2–1)
BUN SERPL-MCNC: 10 MG/DL (ref 6–20)
BUN/CREAT SERPL: 14 (ref 12–20)
CALCIUM SERPL-MCNC: 9.3 MG/DL (ref 8.5–10.1)
CHLORIDE SERPL-SCNC: 104 MMOL/L (ref 97–108)
CHOLEST SERPL-MCNC: 192 MG/DL
CO2 SERPL-SCNC: 30 MMOL/L (ref 21–32)
CREAT SERPL-MCNC: 0.73 MG/DL (ref 0.55–1.02)
EST. AVERAGE GLUCOSE BLD GHB EST-MCNC: 128 MG/DL
GLOBULIN SER CALC-MCNC: 3.9 G/DL (ref 2–4)
GLUCOSE SERPL-MCNC: 93 MG/DL (ref 65–100)
HBA1C MFR BLD: 6.1 % (ref 4–5.6)
HDLC SERPL-MCNC: 64 MG/DL
HDLC SERPL: 3 {RATIO} (ref 0–5)
LDLC SERPL CALC-MCNC: 111.4 MG/DL (ref 0–100)
LIPID PROFILE,FLP: ABNORMAL
POTASSIUM SERPL-SCNC: 4 MMOL/L (ref 3.5–5.1)
PROT SERPL-MCNC: 8.2 G/DL (ref 6.4–8.2)
SODIUM SERPL-SCNC: 139 MMOL/L (ref 136–145)
TRIGL SERPL-MCNC: 83 MG/DL (ref ?–150)
TSH SERPL DL<=0.05 MIU/L-ACNC: 0.66 UIU/ML (ref 0.36–3.74)
VLDLC SERPL CALC-MCNC: 16.6 MG/DL

## 2021-01-29 NOTE — TELEPHONE ENCOUNTER
A message from (no name given) [address] [place] [address] was found on the main line. They wanted to know if there is any insurance on file for this patient, since she has an upcoming appointment early February 2021. I called back their phone#[phone], and spoke to Raj Salazar. He reviewed the information with me, and we confirm that to the best of our knowledge this patient is uninsured.

## 2021-01-29 NOTE — PROGRESS NOTES
Tc from the pt returning the nurses call. She was given the providers lab results message from 01/29/21. Today. The pt was given all recommendations by the provider.  Allen Anders RN

## 2021-01-29 NOTE — PROGRESS NOTES
Tc to the pt to give her her lab result message from the provider. No answer.  A message was left for her to return the call for a message from her . Louise Arzate RN

## 2021-01-29 NOTE — PROGRESS NOTES
Vitamin D is low, restart supplement I sent to pharmacy. Blood sugar is still higher than normal but not diabetes. Continue with diet control  Cholesterol is very mildly elevated but no medication required at this time. Would improve diet. Other labs look good.

## 2021-02-01 ENCOUNTER — HOSPITAL ENCOUNTER (OUTPATIENT)
Dept: ULTRASOUND IMAGING | Age: 46
Discharge: HOME | End: 2021-02-01
Attending: FAMILY MEDICINE

## 2021-02-01 DIAGNOSIS — R10.2 PELVIC PAIN: ICD-10-CM

## 2021-02-01 DIAGNOSIS — N85.2 ENLARGED UTERUS: ICD-10-CM

## 2021-02-01 PROCEDURE — 76830 TRANSVAGINAL US NON-OB: CPT

## 2021-02-01 PROCEDURE — 76856 US EXAM PELVIC COMPLETE: CPT

## 2021-02-01 NOTE — TELEPHONE ENCOUNTER
The pt has had her labs and has been contacted with the results already. Closing this encounter.  Audelia Segovia RN

## 2021-02-02 NOTE — PROGRESS NOTES
Tc to the pt the pt was given the providers message about her U/S results. That the uterus is normal without any masses. There is a small cyst on the right ovary, but it is not large enough to be causing any pain or problems. The pt verbalized understanding and did have any further questions.  Carlos Ba RN

## 2021-02-02 NOTE — PROGRESS NOTES
The uterus is normal without any masses. There is a small cyst in the right ovary but it is not large enough to be causing any pain or problems.

## 2021-12-16 ENCOUNTER — TRANSCRIBE ORDER (OUTPATIENT)
Dept: SCHEDULING | Age: 46
End: 2021-12-16

## 2021-12-16 DIAGNOSIS — Z12.31 VISIT FOR SCREENING MAMMOGRAM: Primary | ICD-10-CM

## 2022-01-05 ENCOUNTER — OFFICE VISIT (OUTPATIENT)
Dept: FAMILY PLANNING/WOMEN'S HEALTH CLINIC | Age: 47
End: 2022-01-05

## 2022-01-05 ENCOUNTER — HOSPITAL ENCOUNTER (OUTPATIENT)
Dept: MAMMOGRAPHY | Age: 47
Discharge: HOME | End: 2022-01-05

## 2022-01-05 DIAGNOSIS — Z01.419 ENCOUNTER FOR WELL WOMAN EXAM: Primary | ICD-10-CM

## 2022-01-05 DIAGNOSIS — Z12.31 VISIT FOR SCREENING MAMMOGRAM: ICD-10-CM

## 2022-01-05 PROCEDURE — 77063 BREAST TOMOSYNTHESIS BI: CPT

## 2022-01-05 NOTE — Clinical Note
Pt was asking for a f/up appt with Dr Vaughn Schrader at the [address] [place] [address], can you schedule her? Thank you!

## 2022-01-05 NOTE — PROGRESS NOTES
Assessment/Plan:    Diagnoses and all orders for this visit:    1. Encounter for well woman exam        Follow-up and Dispositions    Routing History         [address] Thomas expressed understanding of this plan. An AVS was printed and given to the patient.      ----------------------------------------------------------------------    Chief Complaint   Patient presents with    Well Woman       History of Present Illness:    [facility] annual well woman exam  She has no breast concerns today to discuss. She has complaints of chronic groin pain that radiates to her right leg, this has been evaluated before  She is due for primary care check up and I am sending a message to the front office to make an appt at the [facility] for this reason  She has no risk of DV she states  She will be due for pap 11/22  Sometimes she has pain lower ridge of breasts- she has stopped wearing an underwire bra. I suggested that she wear a supportive sports bra     Past Medical History:   Diagnosis Date    Delivery normal     Hypertension        Current Outpatient Medications   Medication Sig Dispense Refill    amLODIPine (NORVASC) 10 mg tablet Take 1 Tab by mouth daily. 90 Tab 3    lisinopriL (PRINIVIL, ZESTRIL) 40 mg tablet Take 1 Tab by mouth daily. 90 Tab 3    ergocalciferol (ERGOCALCIFEROL) 1,250 mcg (50,000 unit) capsule Take 1 Cap by mouth every seven (7) days. 12 Cap 0    Aspirin, Buffered 81 mg tab Take  by mouth.          No Known Allergies    Social History     Tobacco Use    Smoking status: Never Smoker    Smokeless tobacco: Never Used   Substance Use Topics    Alcohol use: No    Drug use: No       Family History   Problem Relation Age of Onset    Stroke Mother        Physical Exam:     Visit Vitals  LMP 12/26/2021       A&Ox3  WDWN NAD  Respirations normal and non labored  Breast exam- cynthia neg for mass, dimpling, retractions, skin color changes, nipple changes

## 2022-01-05 NOTE — PROGRESS NOTES
EVERY WOMANS LIFE HISTORY QUESTIONNAIRE       No Yes Comments   Has a doctor ever seen or felt anything wrong with your breast? [x]                                  []                                     Have you ever had a breast biopsy? [x]                                  []                                          When and where was last mammogram performed? 11/2020 [facility]     Have you ever been told that there was a problem on your mammogram?   No Yes Comments   [x]                                  []                                       Do you have breast implants? No Yes Comments   [x]                                  []                                       When was your last Pap test performed? 11/2019    Have you ever had an abnormal Pap test?   No Yes Comments   [x]                                  []                                       Have you had a hysterectomy? No Yes Comments (why)   [x]                                  []                                       Have you been through menopause? No Yes Date of LMP   [x]                                  []                                  12/26/21     Did your mother take ELTON? No Yes Unknown   [x]                                  []                                       Do you have a history of HIV exposure? No Yes    [x]                                  []                                       Have you ever been diagnosed with any type of Cancer   No Yes Comments (type,when,where,type of treatment   [x]                                  []                                          Has a family member been diagnosed with breast or ovarian cancer?    No Yes Comments (which family members, and type   [x]                                  []                                       Are you taking hormone replacement therapy (HRT)     No Yes Comments   [x]                                  []                                       How many times have you been pregnant? 3     Number of live births ? 3    Are you experiencing any of the following? No Yes Comments   Nipple Discharge [x]                                  []                                     Breast Lump/Masses [x]                                  []                                     Breast Skin Changes [x]                                  []                                          No Yes Comments   Vaginal Discharge [x]                                  []                                     Abnormal/unusual vaginal bleeding [x]                                  []                                         Are you experiencing any other health problems? Pt given CVAN info. Pt having pain to right side of pelvic/groin area that shoots down right leg. Pain comes and goes and does not go with cycles. Pt states she does has odor before and after period. Pt states for past 6 months she has had periods twice a month. Pt given CVAN info. Age at first period? 15  Age at first birth?  25  Ht--5'3\"    Wt--168

## 2022-01-14 ENCOUNTER — VIRTUAL VISIT (OUTPATIENT)
Dept: FAMILY MEDICINE CLINIC | Age: 47
End: 2022-01-14

## 2022-01-14 DIAGNOSIS — R10.31 RIGHT INGUINAL PAIN: ICD-10-CM

## 2022-01-14 DIAGNOSIS — N93.9 ABNORMAL UTERINE BLEEDING: Primary | ICD-10-CM

## 2022-01-14 PROCEDURE — 99442 PR PHYS/QHP TELEPHONE EVALUATION 11-20 MIN: CPT | Performed by: FAMILY MEDICINE

## 2022-01-14 RX ORDER — NORETHINDRONE 5 MG/1
5 TABLET ORAL DAILY
Qty: 30 TABLET | Refills: 0 | Status: SHIPPED | OUTPATIENT
Start: 2022-01-14

## 2022-01-14 NOTE — PROGRESS NOTES
Patient does not have access to vital sign equipment. Coordination of Care  1. Have you been to the ER, urgent care clinic since your last visit? Hospitalized since your last visit? NO    2. Have you seen or consulted any other health care providers outside of the [address] [place] [address] since your last visit? Include any pap smears or colon screening. No    Does the patient need refills? NO    Learning Assessment Complete?  yes  Depression Screening complete in the past 12 months? yes

## 2022-01-14 NOTE — PROGRESS NOTES
Mili Navarro (: 1975) is a 55 y.o. female, established patient, Virtual Visit for evaluation of the following chief complaint(s):   Irregular Menses (x over 6 months) and Leg Pain (pt c/o right leg pain on and off x 2 months)       ASSESSMENT/PLAN:  1. Abnormal uterine bleeding  Trial of Aygestin 5mg and repeat in 1 month or as needed. If not controlled will refer back to Gyn.  2. Right inguinal pain  F2F for exam.    Return for follow up for F2F in 1-2 weeks for Rt groin pain exam..    SUBJECTIVE/OBJECTIVE:  HPI  Irregular Menses:  Having menses every 2-3 weeks for the past 6 months. Saw [facility] earlier this month but did not have PAP because it was not due. Hx of irregular menses in past with less frequent menses. US 2021: no masses. She has not seen Gyn. Was referred in . Rt leg pain:  Rt groin pain at times catching radiating down the front of the Rt thigh. Off/on. No numbness, weakness. Review of Systems   Constitutional: Negative for chills and fever. Genitourinary: Negative for vaginal discharge. Patient-Reported Weight: 166 lb  Patient-Reported LMP: 2021      Physical Exam    On this date 2022 I have spent 16 minutes reviewing previous notes, test results and face to face (virtual) with the patient discussing the diagnosis and importance of compliance with the treatment plan as well as documenting on the day of the visit. Mili Navarro, was evaluated through a synchronous (real-time) audio-video encounter. The patient (or guardian if applicable) is aware that this is a billable service. Verbal consent to proceed has been obtained within the past 12 months. The visit was conducted pursuant to the emergency declaration under the [facility], [address] [place] [address] authority and the [employer] General Act. Patient identification was verified, and a caregiver was present when appropriate. The patient was located in a state where the provider was credentialed to provide care. Patient identification was verified at the start of the visit: YES    Services were provided through a phone synchronous discussion virtually to substitute for in-person clinic visit. Patient was located at home and provider was located in office or at home. An electronic signature was used to authenticate this note.   -- Damion Fowler MD

## 2022-01-14 NOTE — PROGRESS NOTES
AVS was reviewed with patient. No GoodRX coupon available for the pharmacy patient prefers. Patient aware. I have reviewed the provider's instructions with the patient, answering all questions to her satisfaction.   Idalia Gonzalez RN

## 2022-03-18 PROBLEM — R73.03 PREDIABETES: Status: ACTIVE | Noted: 2021-01-28

## 2022-03-19 PROBLEM — E55.9 VITAMIN D DEFICIENCY: Status: ACTIVE | Noted: 2021-01-28

## 2022-03-19 PROBLEM — I10 ESSENTIAL HYPERTENSION: Status: ACTIVE | Noted: 2017-11-27

## 2023-03-23 ENCOUNTER — TRANSCRIBE ORDER (OUTPATIENT)
Dept: SCHEDULING | Age: 48
End: 2023-03-23

## 2023-03-23 DIAGNOSIS — Z12.31 VISIT FOR SCREENING MAMMOGRAM: Primary | ICD-10-CM

## 2023-03-27 ENCOUNTER — HOSPITAL ENCOUNTER (EMERGENCY)
Age: 48
Discharge: HOME | End: 2023-03-28
Attending: EMERGENCY MEDICINE

## 2023-03-27 ENCOUNTER — APPOINTMENT (OUTPATIENT)
Dept: GENERAL RADIOLOGY | Age: 48
End: 2023-03-27
Attending: EMERGENCY MEDICINE

## 2023-03-27 DIAGNOSIS — I10 ESSENTIAL HYPERTENSION: ICD-10-CM

## 2023-03-27 DIAGNOSIS — I10 HYPERTENSION, UNSPECIFIED TYPE: ICD-10-CM

## 2023-03-27 DIAGNOSIS — R07.9 CHEST PAIN, UNSPECIFIED TYPE: Primary | ICD-10-CM

## 2023-03-27 LAB
ALBUMIN SERPL-MCNC: 3.8 G/DL (ref 3.5–5)
ALBUMIN/GLOB SERPL: 0.9 (ref 1.1–2.2)
ALP SERPL-CCNC: 105 U/L (ref 45–117)
ALT SERPL-CCNC: 19 U/L (ref 12–78)
ANION GAP SERPL CALC-SCNC: 6 MMOL/L (ref 5–15)
AST SERPL-CCNC: 14 U/L (ref 15–37)
BASOPHILS # BLD: 0 K/UL (ref 0–0.1)
BASOPHILS NFR BLD: 0 % (ref 0–1)
BILIRUB SERPL-MCNC: 0.3 MG/DL (ref 0.2–1)
BUN SERPL-MCNC: 11 MG/DL (ref 6–20)
BUN/CREAT SERPL: 13 (ref 12–20)
CALCIUM SERPL-MCNC: 8.9 MG/DL (ref 8.5–10.1)
CHLORIDE SERPL-SCNC: 105 MMOL/L (ref 97–108)
CO2 SERPL-SCNC: 27 MMOL/L (ref 21–32)
COMMENT, HOLDF: NORMAL
CREAT SERPL-MCNC: 0.87 MG/DL (ref 0.55–1.02)
DIFFERENTIAL METHOD BLD: NORMAL
EOSINOPHIL # BLD: 0 K/UL (ref 0–0.4)
EOSINOPHIL NFR BLD: 1 % (ref 0–7)
ERYTHROCYTE [DISTWIDTH] IN BLOOD BY AUTOMATED COUNT: 13.6 % (ref 11.5–14.5)
GLOBULIN SER CALC-MCNC: 4.1 G/DL (ref 2–4)
GLUCOSE SERPL-MCNC: 121 MG/DL (ref 65–100)
HCT VFR BLD AUTO: 37.4 % (ref 35–47)
HGB BLD-MCNC: 12.7 G/DL (ref 11.5–16)
IMM GRANULOCYTES # BLD AUTO: 0 K/UL (ref 0–0.04)
IMM GRANULOCYTES NFR BLD AUTO: 0 % (ref 0–0.5)
LIPASE SERPL-CCNC: 213 U/L (ref 73–393)
LYMPHOCYTES # BLD: 3.2 K/UL (ref 0.8–3.5)
LYMPHOCYTES NFR BLD: 39 % (ref 12–49)
MCH RBC QN AUTO: 28.5 PG (ref 26–34)
MCHC RBC AUTO-ENTMCNC: 34 G/DL (ref 30–36.5)
MCV RBC AUTO: 84 FL (ref 80–99)
MONOCYTES # BLD: 0.6 K/UL (ref 0–1)
MONOCYTES NFR BLD: 8 % (ref 5–13)
NEUTS SEG # BLD: 4.2 K/UL (ref 1.8–8)
NEUTS SEG NFR BLD: 52 % (ref 32–75)
NRBC # BLD: 0 K/UL (ref 0–0.01)
NRBC BLD-RTO: 0 PER 100 WBC
PLATELET # BLD AUTO: 270 K/UL (ref 150–400)
PMV BLD AUTO: 10.3 FL (ref 8.9–12.9)
POTASSIUM SERPL-SCNC: 3.1 MMOL/L (ref 3.5–5.1)
PROT SERPL-MCNC: 7.9 G/DL (ref 6.4–8.2)
RBC # BLD AUTO: 4.45 M/UL (ref 3.8–5.2)
SAMPLES BEING HELD,HOLD: NORMAL
SODIUM SERPL-SCNC: 138 MMOL/L (ref 136–145)
TROPONIN I SERPL HS-MCNC: 4 NG/L (ref 0–51)
WBC # BLD AUTO: 8.1 K/UL (ref 3.6–11)

## 2023-03-27 PROCEDURE — 96374 THER/PROPH/DIAG INJ IV PUSH: CPT

## 2023-03-27 PROCEDURE — 36415 COLL VENOUS BLD VENIPUNCTURE: CPT

## 2023-03-27 PROCEDURE — 83690 ASSAY OF LIPASE: CPT

## 2023-03-27 PROCEDURE — 93005 ELECTROCARDIOGRAM TRACING: CPT

## 2023-03-27 PROCEDURE — 84484 ASSAY OF TROPONIN QUANT: CPT

## 2023-03-27 PROCEDURE — 80053 COMPREHEN METABOLIC PANEL: CPT

## 2023-03-27 PROCEDURE — 99285 EMERGENCY DEPT VISIT HI MDM: CPT

## 2023-03-27 PROCEDURE — 71046 X-RAY EXAM CHEST 2 VIEWS: CPT

## 2023-03-27 PROCEDURE — 74011250636 HC RX REV CODE- 250/636: Performed by: EMERGENCY MEDICINE

## 2023-03-27 PROCEDURE — 96375 TX/PRO/DX INJ NEW DRUG ADDON: CPT

## 2023-03-27 PROCEDURE — 85025 COMPLETE CBC W/AUTO DIFF WBC: CPT

## 2023-03-27 RX ORDER — KETOROLAC TROMETHAMINE 30 MG/ML
30 INJECTION, SOLUTION INTRAMUSCULAR; INTRAVENOUS
Status: COMPLETED | OUTPATIENT
Start: 2023-03-27 | End: 2023-03-27

## 2023-03-27 RX ORDER — HYDRALAZINE HYDROCHLORIDE 20 MG/ML
10 INJECTION INTRAMUSCULAR; INTRAVENOUS ONCE
Status: COMPLETED | OUTPATIENT
Start: 2023-03-27 | End: 2023-03-27

## 2023-03-27 RX ADMIN — HYDRALAZINE HYDROCHLORIDE 10 MG: 20 INJECTION INTRAMUSCULAR; INTRAVENOUS at 23:53

## 2023-03-27 RX ADMIN — KETOROLAC TROMETHAMINE 30 MG: 30 INJECTION, SOLUTION INTRAMUSCULAR at 23:53

## 2023-03-28 ENCOUNTER — TELEPHONE (OUTPATIENT)
Dept: FAMILY PLANNING/WOMEN'S HEALTH CLINIC | Age: 48
End: 2023-03-28

## 2023-03-28 VITALS
WEIGHT: 170 LBS | RESPIRATION RATE: 14 BRPM | HEIGHT: 63 IN | BODY MASS INDEX: 30.12 KG/M2 | HEART RATE: 73 BPM | TEMPERATURE: 97.5 F | OXYGEN SATURATION: 99 % | DIASTOLIC BLOOD PRESSURE: 91 MMHG | SYSTOLIC BLOOD PRESSURE: 196 MMHG

## 2023-03-28 LAB
ATRIAL RATE: 68 BPM
CALCULATED P AXIS, ECG09: 53 DEGREES
CALCULATED R AXIS, ECG10: 53 DEGREES
CALCULATED T AXIS, ECG11: 31 DEGREES
DIAGNOSIS, 93000: NORMAL
P-R INTERVAL, ECG05: 174 MS
Q-T INTERVAL, ECG07: 438 MS
QRS DURATION, ECG06: 96 MS
QTC CALCULATION (BEZET), ECG08: 465 MS
VENTRICULAR RATE, ECG03: 68 BPM

## 2023-03-28 RX ORDER — LISINOPRIL 40 MG/1
40 TABLET ORAL DAILY
Qty: 30 TABLET | Refills: 3 | Status: SHIPPED | OUTPATIENT
Start: 2023-03-28 | End: 2023-04-27

## 2023-03-28 RX ORDER — AMLODIPINE BESYLATE 10 MG/1
10 TABLET ORAL DAILY
Qty: 30 TABLET | Refills: 2 | Status: SHIPPED | OUTPATIENT
Start: 2023-03-28 | End: 2023-04-27

## 2023-03-28 NOTE — ED TRIAGE NOTES
Pt arrives ambulatory to triage reporting diffuse right sided chest pain that started yesterday, off and on since it started. It did wake her up during the night. Pt took 81 mg ASA about 1300.

## 2023-03-28 NOTE — ED PROVIDER NOTES
49-year-old female with history of hypertension however has not been taking her medications for the last month due to no insurance previously on lisinopril and Norvasc, history of cholecystectomy and no history of CAD or DVT or PE. Patient presenting ER with right-sided chest pain has been on and off for the last 1 to 2 days. Denies any associated symptoms. Pain radiates to the shoulder but no numbness in the arm no numbness or tingling in the arms or in the legs. No dizziness lightheadedness or diaphoresis. Patient denies any abdominal pain or burping or belching. No recent URI-like symptoms. No report of exertional dyspnea or chest pain. Past Medical History:   Diagnosis Date    Delivery normal     Hypertension        Past Surgical History:   Procedure Laterality Date    HX CHOLECYSTECTOMY      HX GYN      tubal ligation    HX OTHER SURGICAL      pancreatic cyst  removed         Family History:   Problem Relation Age of Onset    Stroke Mother        Social History     Socioeconomic History    Marital status:      Spouse name: Not on file    Number of children: Not on file    Years of education: Not on file    Highest education level: Not on file   Occupational History    Not on file   Tobacco Use    Smoking status: Never    Smokeless tobacco: Never   Substance and Sexual Activity    Alcohol use: No    Drug use: No    Sexual activity: Not Currently   Other Topics Concern    Not on file   Social History Narrative    Not on file     Social Determinants of Health     Financial Resource Strain: Not on file   Food Insecurity: Not on file   Transportation Needs: Not on file   Physical Activity: Not on file   Stress: Not on file   Social Connections: Not on file   Intimate Partner Violence: Not on file   Housing Stability: Not on file         ALLERGIES: Patient has no known allergies. Review of Systems   Constitutional:  Negative for chills and fever.    HENT:  Negative for congestion and sore throat. Eyes:  Negative for pain. Respiratory:  Negative for shortness of breath. Cardiovascular:  Positive for chest pain. Gastrointestinal:  Negative for abdominal pain, diarrhea, nausea and vomiting. Genitourinary:  Negative for dysuria and flank pain. Musculoskeletal:  Negative for back pain and neck pain. Skin:  Negative for rash. Neurological:  Negative for dizziness and headaches. All other systems reviewed and are negative. Vitals:    03/27/23 2351 03/28/23 0004 03/28/23 0006 03/28/23 0039   BP: (!) 203/100 (!) 185/80  (!) 196/91   Pulse:  66 67 73   Resp:  18  14   Temp:       SpO2:  100%  99%   Weight:       Height:                Physical Exam  Vitals and nursing note reviewed. Constitutional:       Appearance: She is well-developed. HENT:      Head: Normocephalic. Eyes:      Conjunctiva/sclera: Conjunctivae normal.   Cardiovascular:      Rate and Rhythm: Normal rate and regular rhythm. Pulses:           Radial pulses are 2+ on the right side and 2+ on the left side. Dorsalis pedis pulses are 2+ on the right side and 2+ on the left side. Heart sounds: No murmur heard. Pulmonary:      Effort: Pulmonary effort is normal. No respiratory distress. Breath sounds: Normal breath sounds. Abdominal:      General: Bowel sounds are normal.      Palpations: Abdomen is soft. Tenderness: There is no abdominal tenderness. Musculoskeletal:         General: Normal range of motion. Cervical back: Normal range of motion and neck supple. Skin:     General: Skin is warm. Capillary Refill: Capillary refill takes less than 2 seconds. Findings: No rash. Neurological:      Mental Status: She is alert and oriented to person, place, and time. Comments: No gross motor or sensory deficits        Medical Decision Making  Patient with right-sided chest pain has been on and off for couple days. No exacerbating factors.   Has history of hypertension but has not been taking her meds due to an no insurance. EKG is unremarkable with no signs of ischemia. Troponin unremarkable. Chest x-ray labs and electrolytes with no significant findings. Given IV hydralazine for blood pressure. Discussed good Rx and because of her prescriptions being anywhere from $3-$10.  Given refills of her prescriptions. Patient stable for discharge. Amount and/or Complexity of Data Reviewed  Labs: ordered. Decision-making details documented in ED Course. Radiology: ordered and independent interpretation performed. Decision-making details documented in ED Course. ECG/medicine tests: ordered and independent interpretation performed. Decision-making details documented in ED Course. Risk  Prescription drug management. ED Course as of 03/28/23 0330   Mon Mar 27, 2023   2314 11:14 PM  EKG: NSR. Rate 68. Normal intervals, normal axis, no ST-elevations or depressions. No signs of ischemia. Interpreted by Dilip Bhandari MD       [ZD]      ED Course User Index  [ZD] Maureen Harrington MD       Procedures               Recent Results (from the past 24 hour(s))   CBC WITH AUTOMATED DIFF    Collection Time: 03/27/23 11:06 PM   Result Value Ref Range    WBC 8.1 3.6 - 11.0 K/uL    RBC 4.45 3.80 - 5.20 M/uL    HGB 12.7 11.5 - 16.0 g/dL    HCT 37.4 35.0 - 47.0 %    MCV 84.0 80.0 - 99.0 FL    MCH 28.5 26.0 - 34.0 PG    MCHC 34.0 30.0 - 36.5 g/dL    RDW 13.6 11.5 - 14.5 %    PLATELET 795 331 - 926 K/uL    MPV 10.3 8.9 - 12.9 FL    NRBC 0.0 0  WBC    ABSOLUTE NRBC 0.00 0.00 - 0.01 K/uL    NEUTROPHILS 52 32 - 75 %    LYMPHOCYTES 39 12 - 49 %    MONOCYTES 8 5 - 13 %    EOSINOPHILS 1 0 - 7 %    BASOPHILS 0 0 - 1 %    IMMATURE GRANULOCYTES 0 0.0 - 0.5 %    ABS. NEUTROPHILS 4.2 1.8 - 8.0 K/UL    ABS. LYMPHOCYTES 3.2 0.8 - 3.5 K/UL    ABS. MONOCYTES 0.6 0.0 - 1.0 K/UL    ABS. EOSINOPHILS 0.0 0.0 - 0.4 K/UL    ABS. BASOPHILS 0.0 0.0 - 0.1 K/UL    ABS. IMM.  GRANS. 0.0 0.00 - 0.04 K/UL DF AUTOMATED     METABOLIC PANEL, COMPREHENSIVE    Collection Time: 03/27/23 11:06 PM   Result Value Ref Range    Sodium 138 136 - 145 mmol/L    Potassium 3.1 (L) 3.5 - 5.1 mmol/L    Chloride 105 97 - 108 mmol/L    CO2 27 21 - 32 mmol/L    Anion gap 6 5 - 15 mmol/L    Glucose 121 (H) 65 - 100 mg/dL    BUN 11 6 - 20 MG/DL    Creatinine 0.87 0.55 - 1.02 MG/DL    BUN/Creatinine ratio 13 12 - 20      eGFR >60 >60 ml/min/1.73m2    Calcium 8.9 8.5 - 10.1 MG/DL    Bilirubin, total 0.3 0.2 - 1.0 MG/DL    ALT (SGPT) 19 12 - 78 U/L    AST (SGOT) 14 (L) 15 - 37 U/L    Alk. phosphatase 105 45 - 117 U/L    Protein, total 7.9 6.4 - 8.2 g/dL    Albumin 3.8 3.5 - 5.0 g/dL    Globulin 4.1 (H) 2.0 - 4.0 g/dL    A-G Ratio 0.9 (L) 1.1 - 2.2     TROPONIN-HIGH SENSITIVITY    Collection Time: 03/27/23 11:06 PM   Result Value Ref Range    Troponin-High Sensitivity 4 0 - 51 ng/L   LIPASE    Collection Time: 03/27/23 11:06 PM   Result Value Ref Range    Lipase 213 73 - 393 U/L   SAMPLES BEING HELD    Collection Time: 03/27/23 11:06 PM   Result Value Ref Range    SAMPLES BEING HELD 1red,1blu,1sst     COMMENT        Add-on orders for these samples will be processed based on acceptable specimen integrity and analyte stability, which may vary by analyte. EKG, 12 LEAD, INITIAL    Collection Time: 03/27/23 11:11 PM   Result Value Ref Range    Ventricular Rate 68 BPM    Atrial Rate 68 BPM    P-R Interval 174 ms    QRS Duration 96 ms    Q-T Interval 438 ms    QTC Calculation (Bezet) 465 ms    Calculated P Axis 53 degrees    Calculated R Axis 53 degrees    Calculated T Axis 31 degrees    Diagnosis       Normal sinus rhythm  Normal ECG  When compared with ECG of 25-JAN-2016 15:54,  No significant change was found         XR CHEST PA LAT    Result Date: 3/27/2023  INDICATION:   CP COMPARISON: January 25, 2016 FINDINGS: Frontal and lateral views of the chest demonstrate a normal cardiomediastinal silhouette. The lungs are adequately expanded.   There is no edema, effusion, consolidation, or pneumothorax. The osseous structures are unremarkable. No acute process.

## 2023-03-28 NOTE — TELEPHONE ENCOUNTER
Called patient to remind of upcoming appointment 2x. No answer from the patient, left voicemail on 2nd attempt. Provided the screening line([phone]) in case patient has to reschedule.

## 2023-04-05 ENCOUNTER — HOSPITAL ENCOUNTER (OUTPATIENT)
Facility: HOSPITAL | Age: 48
Setting detail: SPECIMEN
Discharge: HOME | End: 2023-04-08

## 2023-04-05 ENCOUNTER — OFFICE VISIT (OUTPATIENT)
Dept: FAMILY PLANNING/WOMEN'S HEALTH CLINIC | Age: 48
End: 2023-04-05

## 2023-04-05 ENCOUNTER — HOSPITAL ENCOUNTER (OUTPATIENT)
Dept: MAMMOGRAPHY | Age: 48
End: 2023-04-05
Attending: FAMILY MEDICINE

## 2023-04-05 PROCEDURE — 87624 HPV HI-RISK TYP POOLED RSLT: CPT

## 2023-04-05 PROCEDURE — 77063 BREAST TOMOSYNTHESIS BI: CPT

## 2023-04-05 PROCEDURE — 88175 CYTOPATH C/V AUTO FLUID REDO: CPT

## 2023-04-05 NOTE — PROGRESS NOTES
EVERY WOMANS LIFE HISTORY QUESTIONNAIRE       No Yes Comments   Has a doctor ever seen or felt anything wrong with your breast? [x]                                  []                                     Have you ever had a breast biopsy? [x]                                  []                                          When and where was last mammogram performed? 1/5/2022 [facility]    Have you ever been told that there was a problem on your mammogram?   No Yes Comments   [x]                                  []                                       Do you have breast implants? No Yes Comments   [x]                                  []                                       When was your last Pap test performed? 11/9/2019 [facility]. Will have pap today. Have you ever had an abnormal Pap test?   No Yes Comments   [x]                                  []                                       Have you had a hysterectomy? No Yes Comments (why)   [x]                                  []                                       Have you been through menopause? No Yes Date of LMP   [x]                                  []                                  3/15/23     Did your mother take ELTON? No Yes Unknown   [x]                                  []                                       Do you have a history of HIV exposure? No Yes    [x]                                  []                                       Have you ever been diagnosed with any type of Cancer   No Yes Comments (type,when,where,type of treatment   [x]                                  []                                          Has a family member been diagnosed with breast or ovarian cancer?    No Yes Comments (which family members, and type   [x]                                  []                                       Are you taking hormone replacement therapy (HRT)     No Yes Comments   [x]                                  [] How many times have you been pregnant? 3     Number of live births ? 3    Are you experiencing any of the following? No Yes Comments   Nipple Discharge [x]                                  []                                     Breast Lump/Masses [x]                                  []                                     Breast Skin Changes [x]                                  []                                          No Yes Comments   Vaginal Discharge [x]                                  []                                     Abnormal/unusual vaginal bleeding [x]                                  []                                         Are you experiencing any other health problems? Age at first period?  15  Age at first birth? 25    Ht--5'3\"    Wt--170lbs

## 2023-04-05 NOTE — PROGRESS NOTES
Mary Diallo is a 52 y.o. female   Chief Complaint   Patient presents with    Well Woman         ASSESSMENT AND PLAN:    1. Encounter for well woman exam  Normal CBE. Screening mammogram today. Specimen obtained and sent to lab for cotesting. Followup per ASCCP guidelines. - PAP IG, APTIMA HPV AND RFX 16/18,45 ([id number]); Future      SUBJECTIVE:    HPI:  Mary Diallo is a 52 y.o. female who presents for breast and cervical cancer screening. She denies breast pain, lumps, nipple discharge, nipple inversion and overlying skin changes. No FH of breast or ovarian cancer. She denies vaginal discahrge or irritation. No history of abnormal paps or mammograms. Review of Systems   Constitutional: Negative. Respiratory: Negative. Cardiovascular: Negative. Gastrointestinal: Negative. LMP 03/15/2023     Physical Exam  Constitutional:       General: She is not in acute distress. Appearance: Normal appearance. Pulmonary:      Effort: Pulmonary effort is normal.   Chest:   Breasts:     Right: Normal. No inverted nipple, mass, nipple discharge, skin change or tenderness. Left: Normal. No inverted nipple, mass, nipple discharge, skin change or tenderness. Genitourinary:     General: Normal vulva. Vagina: Normal.      Cervix: Normal.   Lymphadenopathy:      Upper Body:      Right upper body: No supraclavicular, axillary or pectoral adenopathy. Left upper body: No supraclavicular, axillary or pectoral adenopathy. Neurological:      Mental Status: She is alert and oriented to person, place, and time.

## 2023-04-23 DIAGNOSIS — Z12.31 VISIT FOR SCREENING MAMMOGRAM: Primary | ICD-10-CM

## 2023-05-09 ENCOUNTER — APPOINTMENT (OUTPATIENT)
Facility: HOSPITAL | Age: 48
End: 2023-05-09

## 2023-05-09 ENCOUNTER — HOSPITAL ENCOUNTER (EMERGENCY)
Facility: HOSPITAL | Age: 48
Discharge: HOME | End: 2023-05-09
Attending: STUDENT IN AN ORGANIZED HEALTH CARE EDUCATION/TRAINING PROGRAM

## 2023-05-09 VITALS
RESPIRATION RATE: 16 BRPM | HEIGHT: 63 IN | BODY MASS INDEX: 30.12 KG/M2 | TEMPERATURE: 97.9 F | WEIGHT: 170 LBS | HEART RATE: 74 BPM | DIASTOLIC BLOOD PRESSURE: 86 MMHG | OXYGEN SATURATION: 100 % | SYSTOLIC BLOOD PRESSURE: 155 MMHG

## 2023-05-09 DIAGNOSIS — M79.18 MUSCULOSKELETAL PAIN: ICD-10-CM

## 2023-05-09 DIAGNOSIS — S39.012A BACK STRAIN, INITIAL ENCOUNTER: Primary | ICD-10-CM

## 2023-05-09 LAB
ALBUMIN SERPL-MCNC: 3.8 G/DL (ref 3.5–5)
ALBUMIN/GLOB SERPL: 1 (ref 1.1–2.2)
ALP SERPL-CCNC: 107 U/L (ref 45–117)
ALT SERPL-CCNC: 27 U/L (ref 12–78)
ANION GAP SERPL CALC-SCNC: 9 MMOL/L (ref 5–15)
AST SERPL-CCNC: 15 U/L (ref 15–37)
BASOPHILS # BLD: 0 K/UL (ref 0–0.1)
BASOPHILS NFR BLD: 0 % (ref 0–1)
BILIRUB SERPL-MCNC: 0.2 MG/DL (ref 0.2–1)
BUN SERPL-MCNC: 9 MG/DL (ref 6–20)
BUN/CREAT SERPL: 9 (ref 12–20)
CALCIUM SERPL-MCNC: 9.1 MG/DL (ref 8.5–10.1)
CHLORIDE SERPL-SCNC: 102 MMOL/L (ref 97–108)
CO2 SERPL-SCNC: 28 MMOL/L (ref 21–32)
CREAT SERPL-MCNC: 0.96 MG/DL (ref 0.55–1.02)
D DIMER PPP FEU-MCNC: 0.4 MG/L FEU (ref 0–0.65)
DIFFERENTIAL METHOD BLD: ABNORMAL
EOSINOPHIL # BLD: 0 K/UL (ref 0–0.4)
EOSINOPHIL NFR BLD: 1 % (ref 0–7)
ERYTHROCYTE [DISTWIDTH] IN BLOOD BY AUTOMATED COUNT: 13 % (ref 11.5–14.5)
GLOBULIN SER CALC-MCNC: 4 G/DL (ref 2–4)
GLUCOSE SERPL-MCNC: 105 MG/DL (ref 65–100)
HCT VFR BLD AUTO: 34.8 % (ref 35–47)
HGB BLD-MCNC: 11.8 G/DL (ref 11.5–16)
IMM GRANULOCYTES # BLD AUTO: 0 K/UL (ref 0–0.04)
IMM GRANULOCYTES NFR BLD AUTO: 0 % (ref 0–0.5)
LIPASE SERPL-CCNC: 181 U/L (ref 73–393)
LYMPHOCYTES # BLD: 2.4 K/UL (ref 0.8–3.5)
LYMPHOCYTES NFR BLD: 34 % (ref 12–49)
MCH RBC QN AUTO: 29.1 PG (ref 26–34)
MCHC RBC AUTO-ENTMCNC: 33.9 G/DL (ref 30–36.5)
MCV RBC AUTO: 85.9 FL (ref 80–99)
MONOCYTES # BLD: 0.7 K/UL (ref 0–1)
MONOCYTES NFR BLD: 10 % (ref 5–13)
NEUTS SEG # BLD: 3.9 K/UL (ref 1.8–8)
NEUTS SEG NFR BLD: 56 % (ref 32–75)
NRBC # BLD: 0 K/UL (ref 0–0.01)
NRBC BLD-RTO: 0 PER 100 WBC
PLATELET # BLD AUTO: 236 K/UL (ref 150–400)
PMV BLD AUTO: 10.2 FL (ref 8.9–12.9)
POTASSIUM SERPL-SCNC: 3.9 MMOL/L (ref 3.5–5.1)
PROT SERPL-MCNC: 7.8 G/DL (ref 6.4–8.2)
RBC # BLD AUTO: 4.05 M/UL (ref 3.8–5.2)
SODIUM SERPL-SCNC: 139 MMOL/L (ref 136–145)
TROPONIN I SERPL HS-MCNC: 4 NG/L (ref 0–51)
WBC # BLD AUTO: 6.9 K/UL (ref 3.6–11)

## 2023-05-09 PROCEDURE — 71046 X-RAY EXAM CHEST 2 VIEWS: CPT

## 2023-05-09 PROCEDURE — 85379 FIBRIN DEGRADATION QUANT: CPT

## 2023-05-09 PROCEDURE — 85025 COMPLETE CBC W/AUTO DIFF WBC: CPT

## 2023-05-09 PROCEDURE — 80053 COMPREHEN METABOLIC PANEL: CPT

## 2023-05-09 PROCEDURE — 83690 ASSAY OF LIPASE: CPT

## 2023-05-09 PROCEDURE — 36415 COLL VENOUS BLD VENIPUNCTURE: CPT

## 2023-05-09 PROCEDURE — 99285 EMERGENCY DEPT VISIT HI MDM: CPT

## 2023-05-09 PROCEDURE — 84484 ASSAY OF TROPONIN QUANT: CPT

## 2023-05-09 PROCEDURE — 93005 ELECTROCARDIOGRAM TRACING: CPT | Performed by: STUDENT IN AN ORGANIZED HEALTH CARE EDUCATION/TRAINING PROGRAM

## 2023-05-09 RX ORDER — METHYLPREDNISOLONE 4 MG/1
2 TABLET ORAL DAILY
Qty: 3 TABLET | Refills: 0 | Status: SHIPPED | OUTPATIENT
Start: 2023-05-09 | End: 2023-05-15

## 2023-05-09 RX ORDER — CYCLOBENZAPRINE HCL 10 MG
10 TABLET ORAL 3 TIMES DAILY PRN
Qty: 15 TABLET | Refills: 0 | Status: SHIPPED | OUTPATIENT
Start: 2023-05-09 | End: 2023-05-19

## 2023-05-09 ASSESSMENT — ENCOUNTER SYMPTOMS
ABDOMINAL PAIN: 0
EYE PAIN: 0
TROUBLE SWALLOWING: 0
VOMITING: 0
EYE REDNESS: 0
SINUS PAIN: 0
ABDOMINAL DISTENTION: 0
SINUS PRESSURE: 0
COUGH: 0
WHEEZING: 0
NAUSEA: 0
BACK PAIN: 1
SHORTNESS OF BREATH: 0
CHEST TIGHTNESS: 1

## 2023-05-09 ASSESSMENT — LIFESTYLE VARIABLES
HOW MANY STANDARD DRINKS CONTAINING ALCOHOL DO YOU HAVE ON A TYPICAL DAY: PATIENT DOES NOT DRINK
HOW OFTEN DO YOU HAVE A DRINK CONTAINING ALCOHOL: NEVER

## 2023-05-09 NOTE — ED NOTES
Bedside shift change report given to Paulo RN (oncoming nurse) by Cleveland Castro RN (offgoing nurse). Report included the following information ED SBAR.        Nathalia Jaramillo RN  05/09/23 0107

## 2023-05-09 NOTE — ED NOTES
Discharge instructions given to pt by RN. Pt educated on prescribed medications in teach back method and verbalizes understanding. Opportunity for questions provided.  Pt ambulatory out of unit, in no acute distress and taken home by daughter        Hayley PattersonJT  05/09/23 3333

## 2023-05-09 NOTE — ED TRIAGE NOTES
Pt ambulated to the treatment area with a steady gait. Pt states \"I woke up this morning with pain in my left upper shoulder and back it feels like its also in my chest and it hurts more when I take a deep breath. \"

## 2023-05-09 NOTE — ED NOTES
Verbal shift change report given from [employer], RN (offgoing nurse). Report included the following information ED SBAR, MAR, and Recent Results.         Phillip Rodriguez RN  05/09/23 7443

## 2023-05-11 LAB
EKG ATRIAL RATE: 63 BPM
EKG DIAGNOSIS: NORMAL
EKG P AXIS: 57 DEGREES
EKG P-R INTERVAL: 170 MS
EKG Q-T INTERVAL: 414 MS
EKG QRS DURATION: 92 MS
EKG QTC CALCULATION (BAZETT): 423 MS
EKG R AXIS: 41 DEGREES
EKG T AXIS: 34 DEGREES
EKG VENTRICULAR RATE: 63 BPM

## 2023-05-11 PROCEDURE — 93010 ELECTROCARDIOGRAM REPORT: CPT | Performed by: SPECIALIST

## 2024-04-10 ENCOUNTER — HOSPITAL ENCOUNTER (EMERGENCY)
Facility: HOSPITAL | Age: 49
Discharge: HOME | End: 2024-04-10
Attending: EMERGENCY MEDICINE

## 2024-04-10 VITALS
HEIGHT: 63 IN | BODY MASS INDEX: 31.01 KG/M2 | WEIGHT: 175 LBS | SYSTOLIC BLOOD PRESSURE: 192 MMHG | RESPIRATION RATE: 24 BRPM | DIASTOLIC BLOOD PRESSURE: 113 MMHG | TEMPERATURE: 97.5 F | OXYGEN SATURATION: 100 % | HEART RATE: 80 BPM

## 2024-04-10 DIAGNOSIS — R06.4 HYPERVENTILATION: ICD-10-CM

## 2024-04-10 DIAGNOSIS — R11.2 NAUSEA AND VOMITING, UNSPECIFIED VOMITING TYPE: Primary | ICD-10-CM

## 2024-04-10 LAB
ALBUMIN SERPL-MCNC: 4.1 G/DL (ref 3.5–5)
ALBUMIN/GLOB SERPL: 0.9 (ref 1.1–2.2)
ALP SERPL-CCNC: 117 U/L (ref 45–117)
ALT SERPL-CCNC: 21 U/L (ref 12–78)
ANION GAP SERPL CALC-SCNC: 11 MMOL/L (ref 5–15)
APPEARANCE UR: CLEAR
AST SERPL-CCNC: 17 U/L (ref 15–37)
BACTERIA URNS QL MICRO: NEGATIVE /HPF
BASOPHILS # BLD: 0 K/UL (ref 0–0.1)
BASOPHILS NFR BLD: 0 % (ref 0–1)
BILIRUB SERPL-MCNC: 0.5 MG/DL (ref 0.2–1)
BILIRUB UR QL: NEGATIVE
BUN SERPL-MCNC: 8 MG/DL (ref 6–20)
BUN/CREAT SERPL: 9 (ref 12–20)
CALCIUM SERPL-MCNC: 9.5 MG/DL (ref 8.5–10.1)
CHLORIDE SERPL-SCNC: 103 MMOL/L (ref 97–108)
CO2 SERPL-SCNC: 20 MMOL/L (ref 21–32)
COLOR UR: ABNORMAL
COMMENT:: NORMAL
CREAT SERPL-MCNC: 0.92 MG/DL (ref 0.55–1.02)
DIFFERENTIAL METHOD BLD: ABNORMAL
EOSINOPHIL # BLD: 0 K/UL (ref 0–0.4)
EOSINOPHIL NFR BLD: 0 % (ref 0–7)
EPITH CASTS URNS QL MICRO: ABNORMAL /LPF
ERYTHROCYTE [DISTWIDTH] IN BLOOD BY AUTOMATED COUNT: 13 % (ref 11.5–14.5)
GLOBULIN SER CALC-MCNC: 4.5 G/DL (ref 2–4)
GLUCOSE SERPL-MCNC: 221 MG/DL (ref 65–100)
GLUCOSE UR STRIP.AUTO-MCNC: 500 MG/DL
HCG UR QL: NEGATIVE
HCT VFR BLD AUTO: 40.1 % (ref 35–47)
HGB BLD-MCNC: 13.8 G/DL (ref 11.5–16)
HGB UR QL STRIP: NEGATIVE
HYALINE CASTS URNS QL MICRO: ABNORMAL /LPF (ref 0–2)
IMM GRANULOCYTES # BLD AUTO: 0 K/UL (ref 0–0.04)
IMM GRANULOCYTES NFR BLD AUTO: 0 % (ref 0–0.5)
KETONES UR QL STRIP.AUTO: NEGATIVE MG/DL
LEUKOCYTE ESTERASE UR QL STRIP.AUTO: NEGATIVE
LIPASE SERPL-CCNC: 30 U/L (ref 13–75)
LYMPHOCYTES # BLD: 4.2 K/UL (ref 0.8–3.5)
LYMPHOCYTES NFR BLD: 32 % (ref 12–49)
MCH RBC QN AUTO: 28.3 PG (ref 26–34)
MCHC RBC AUTO-ENTMCNC: 34.4 G/DL (ref 30–36.5)
MCV RBC AUTO: 82.3 FL (ref 80–99)
MONOCYTES # BLD: 0.9 K/UL (ref 0–1)
MONOCYTES NFR BLD: 7 % (ref 5–13)
NEUTS SEG # BLD: 8 K/UL (ref 1.8–8)
NEUTS SEG NFR BLD: 61 % (ref 32–75)
NITRITE UR QL STRIP.AUTO: NEGATIVE
NRBC # BLD: 0 K/UL (ref 0–0.01)
NRBC BLD-RTO: 0 PER 100 WBC
PH UR STRIP: 7.5 (ref 5–8)
PLATELET # BLD AUTO: 350 K/UL (ref 150–400)
PMV BLD AUTO: 11 FL (ref 8.9–12.9)
POTASSIUM SERPL-SCNC: 3.2 MMOL/L (ref 3.5–5.1)
PROT SERPL-MCNC: 8.6 G/DL (ref 6.4–8.2)
PROT UR STRIP-MCNC: NEGATIVE MG/DL
RBC # BLD AUTO: 4.87 M/UL (ref 3.8–5.2)
RBC #/AREA URNS HPF: ABNORMAL /HPF (ref 0–5)
SODIUM SERPL-SCNC: 134 MMOL/L (ref 136–145)
SP GR UR REFRACTOMETRY: 1.02 (ref 1–1.03)
SPECIMEN HOLD: NORMAL
UROBILINOGEN UR QL STRIP.AUTO: 0.2 EU/DL (ref 0.2–1)
WBC # BLD AUTO: 13.2 K/UL (ref 3.6–11)
WBC URNS QL MICRO: ABNORMAL /HPF (ref 0–4)

## 2024-04-10 PROCEDURE — 81025 URINE PREGNANCY TEST: CPT

## 2024-04-10 PROCEDURE — 80053 COMPREHEN METABOLIC PANEL: CPT

## 2024-04-10 PROCEDURE — 96374 THER/PROPH/DIAG INJ IV PUSH: CPT

## 2024-04-10 PROCEDURE — 81001 URINALYSIS AUTO W/SCOPE: CPT

## 2024-04-10 PROCEDURE — 6360000002 HC RX W HCPCS: Performed by: EMERGENCY MEDICINE

## 2024-04-10 PROCEDURE — 99284 EMERGENCY DEPT VISIT MOD MDM: CPT

## 2024-04-10 PROCEDURE — 83690 ASSAY OF LIPASE: CPT

## 2024-04-10 PROCEDURE — 36415 COLL VENOUS BLD VENIPUNCTURE: CPT

## 2024-04-10 PROCEDURE — 96361 HYDRATE IV INFUSION ADD-ON: CPT

## 2024-04-10 PROCEDURE — 85025 COMPLETE CBC W/AUTO DIFF WBC: CPT

## 2024-04-10 PROCEDURE — 2580000003 HC RX 258: Performed by: EMERGENCY MEDICINE

## 2024-04-10 RX ORDER — ONDANSETRON 4 MG/1
4 TABLET, ORALLY DISINTEGRATING ORAL 3 TIMES DAILY PRN
Qty: 21 TABLET | Refills: 0 | Status: SHIPPED | OUTPATIENT
Start: 2024-04-10

## 2024-04-10 RX ORDER — ONDANSETRON 2 MG/ML
4 INJECTION INTRAMUSCULAR; INTRAVENOUS ONCE
Status: COMPLETED | OUTPATIENT
Start: 2024-04-10 | End: 2024-04-10

## 2024-04-10 RX ORDER — 0.9 % SODIUM CHLORIDE 0.9 %
1000 INTRAVENOUS SOLUTION INTRAVENOUS ONCE
Status: COMPLETED | OUTPATIENT
Start: 2024-04-10 | End: 2024-04-10

## 2024-04-10 RX ADMIN — SODIUM CHLORIDE 1000 ML: 9 INJECTION, SOLUTION INTRAVENOUS at 01:51

## 2024-04-10 RX ADMIN — ONDANSETRON 4 MG: 2 INJECTION INTRAMUSCULAR; INTRAVENOUS at 01:53

## 2024-04-10 ASSESSMENT — PAIN - FUNCTIONAL ASSESSMENT: PAIN_FUNCTIONAL_ASSESSMENT: 0-10

## 2024-04-10 ASSESSMENT — PAIN SCALES - GENERAL: PAINLEVEL_OUTOF10: 10

## 2024-04-10 NOTE — ED TRIAGE NOTES
Pt moved off EMS stretcher to ED stretcher.     Pt was discharged from [facility] an hr ago for leg/abdominal pain, got morphine and nausea medicine from there.     Pt got home, started experiencing tingling in her feet and hands, with N/V.     Pt has hx of HBP

## 2024-04-10 NOTE — ED NOTES
Discharge instructions reviewed with pt. Opportunity for questions given, no concerns present at this time.

## 2024-04-10 NOTE — ED PROVIDER NOTES
emergency physician with the below findings:        Interpretation per the Radiologist below, if available at the time of this note:    No orders to display        LABS:  Labs Reviewed   CBC WITH AUTO DIFFERENTIAL - Abnormal; Notable for the following components:       Result Value    WBC 13.2 (*)     Lymphocytes Absolute 4.2 (*)     All other components within normal limits   COMPREHENSIVE METABOLIC PANEL - Abnormal; Notable for the following components:    Sodium 134 (*)     Potassium 3.2 (*)     CO2 20 (*)     Glucose 221 (*)     Bun/Cre Ratio 9 (*)     Total Protein 8.6 (*)     Globulin 4.5 (*)     Albumin/Globulin Ratio 0.9 (*)     All other components within normal limits   EXTRA TUBES HOLD   LIPASE   URINALYSIS WITH MICROSCOPIC   POC PREGNANCY UR-QUAL       All other labs were within normal range or not returned as of this dictation.    EMERGENCY DEPARTMENT COURSE and DIFFERENTIAL DIAGNOSIS/MDM:   Vitals:    Vitals:    04/10/24 0144 04/10/24 0155   BP: (!) 192/113    Pulse: 88 80   Resp: 24    Temp: 97.5 °F (36.4 °C)    TempSrc: Oral    SpO2: 100%    Weight: 79.4 kg (175 lb)    Height: 1.6 m (5' 3\")            Medical Decision Making  Amount and/or Complexity of Data Reviewed  Labs: ordered.    Risk  Prescription drug management.            REASSESSMENT      Progress Note:  Results, treatment, and follow up plan have been discussed with patient.  Questions were answered.  She feels better at the time of discharge and is tolerating p.o.  Pepe Klein MD  4:45 AM        CONSULTS:  None    PROCEDURES:  Unless otherwise noted below, none     Procedures      FINAL IMPRESSION      Assessment/plan: 48-year-old with a history of hypertension.  She presents with extremity numbness and tingling with nausea and vomiting.  Differential diagnosis includes hyperventilation (which I suspect), viral GI illness, and others.  She was evaluated in an outside ED for left flank pain before coming here.  She states that they did

## 2024-05-13 NOTE — PROGRESS NOTES
5/5/2020 [phone]  Lord Kelly consented to Telephone visit. Patient speaks [place]. Assessment/Plan:       ICD-10-CM ICD-9-CM    1. Menorrhagia with irregular cycle N92.1 626.2 CBC WITH AUTOMATED DIFF      REFERRAL TO GYNECOLOGY   Patient is having more frequent periods than every 28 days with cramping of lower abdomen and down her leg with vaginal odor. Denies clots or heavy soaking of multiple pads a day. Does have history of anemia. Asks about having a hysterectomy. When I asked her if there was any possibility of pregnancy she stated \"no. \"  Discussed signs/symptoms/reasons to go to the ED. Ibuprofen is not helping for cramps but midol is. Last pap smear 2019, next one due in 3 years, normal.         Subjective:   Lord Kelly is a 40 y.o. BLACK OR  female. Chief Complaint   Patient presents with    Irregular Menses     pt c/o menstural cramps every time she has her periods     HPI: twice a month, less than 28 days, lots of cramping, hurts lower abdomen and down her leg, nauseous before her period and odor, wondering what to do about the odor; not a lot on the pad; wearing a pad right now. Does have history of anemia. Sometimes she notices dizziness. States no possibility of pregnancy. It's been like this for a while. Refer to gyn  Return next week for labs - cbc with diff  She has tried ibuprofen, midol - midol helps the cramps and pain reliever. ROS: positive for abdominal pain and cramping  No dyspnea or chest pain on exertion. No change in bowel habits, black or bloody stools. No urinary tract symptoms. Current Outpatient Medications   Medication Sig Dispense Refill    amLODIPine (NORVASC) 10 mg tablet Take 1 Tab by mouth daily. 90 Tab 3    lisinopril (PRINIVIL, ZESTRIL) 40 mg tablet Take 1 Tab by mouth daily. 90 Tab 3    ergocalciferol (ERGOCALCIFEROL) 50,000 unit capsule Take 1 Cap by mouth every seven (7) days.  12 Cap 0    Aspirin, Buffered 81 PATIENT INFORMATION    Anticipatory guidance discussed  Avoid potential choking hazards (large, spherical, or coin shaped foods)  Avoid small toys (choking hazard)  Car seat issues, including proper placement and transition to toddler seat at 20 pounds  Caution with possible poisons (including pills, plants, cosmetics)  Child-proofing home with cabinet locks, outlet plugs, window guards, and stair safety jo  Consider CPR classes  Discipline issues: limit-setting, positive reinforcement  Fluoride supplementation if unfluoridated water supply  Importance of regular dental care  Importance of varied diet  Media violence  Minimizing junk food  Never leave unattended  Poison Control phone number 1-121.414.6431  Read together  Risk of child pulling down objects on him/herself  Safe storage of any firearms in the home  Setting hot water heater less than 120 degrees F  Smoke detectors  Teach child name, address, and phone number  Teach pedestrian safety  Use of transitional object (iris bear, etc.) to help with sleep  Wind-down activities to help with sleep    Follow-Up  - Return for your 4 year well child visit.    3 years old Health and Safety Tips - The following hyperlinks are available to access via [employer]    Parent Education from [employer]    Educación para padres sobre niños sanos    Common dosing for acetaminophen and ibuprofen:   Acetaminophen (Tylenol) can be given every 4 hours.  Infant or Children's Elixir: 160mg/5ml for  Weight 18-23 lbs 24-35 lbs 36-47 lbs   Dose 3.75 ml 5 ml 7.5 ml      Weight 48-59 lbs 60-71 lsb 72-95 lbs   Dose 10 ml 12.5 ml 15 ml     Ibuprofen (Motrin) can be given every 6 hours.  Safe for children 6 months and older.  Infant Drops: 50mg/1.25ml  Weight 12-17 lbs 18-23 lbs   Dose 1.25 ml 1.875 ml     Children's Elixir 100mg/5ml   Weight 12-17 lbs 18-23 lbs 24-35 lbs 36-47 lbs   Dose 2.5 ml 3.75 ml 5 ml 7.5 ml        Weight 48-59 lbs 60-71 lbs 72-95 lbs   Dose 10 ml 12.5 ml 15 ml      Additional Educational Resources:  For additional resources regarding your symptoms, diagnosis, or further health information, please visit the Discover a Healthier You section on [email or web address].com/ or the Online Health Resources section in [employer].     mg tab Take  by mouth. reports that she has never smoked. She has never used smokeless tobacco. She reports that she does not drink alcohol or use drugs. Objective:     Vitals:    05/05/20 1216   Weight: 170 lb (77.1 kg)   Height: 5' 3.19\" (1.605 m)    No results found for any visits on 05/05/20. Physical exam not performed due to telephone visit. Assessment/Plan:   Diagnoses and all orders for this visit:    1. Menorrhagia with irregular cycle  -     CBC WITH AUTOMATED DIFF; Future  -     REFERRAL TO GYNECOLOGY      LAVERNE Motley expressed understanding of this plan.

## 2024-05-21 ENCOUNTER — PATIENT MESSAGE (OUTPATIENT)
Age: 49
End: 2024-05-21

## 2024-06-19 ENCOUNTER — TELEPHONE (OUTPATIENT)
Age: 49
End: 2024-06-19

## 2024-06-19 NOTE — TELEPHONE ENCOUNTER
Adrienk to patient concerning her upcoming appointment. She stated that she has [employer]. I advised her to bring her photo id and insurance card. She knows that [facility] [employer] will not cover her problem visit. I gave her a price range for her office visit for a new patient of $318.00-$610.00. Told her that they will bill her and then she can call [employer] about a discount.

## 2024-06-26 ENCOUNTER — OFFICE VISIT (OUTPATIENT)
Age: 49
End: 2024-06-26
Payer: MEDICAID

## 2024-06-26 VITALS
WEIGHT: 173 LBS | BODY MASS INDEX: 30.65 KG/M2 | HEART RATE: 85 BPM | DIASTOLIC BLOOD PRESSURE: 83 MMHG | SYSTOLIC BLOOD PRESSURE: 154 MMHG

## 2024-06-26 DIAGNOSIS — N93.9 ABNORMAL UTERINE BLEEDING (AUB): Primary | ICD-10-CM

## 2024-06-26 PROCEDURE — 3079F DIAST BP 80-89 MM HG: CPT | Performed by: OBSTETRICS & GYNECOLOGY

## 2024-06-26 PROCEDURE — 3077F SYST BP >= 140 MM HG: CPT | Performed by: OBSTETRICS & GYNECOLOGY

## 2024-06-26 PROCEDURE — 99203 OFFICE O/P NEW LOW 30 MIN: CPT | Performed by: OBSTETRICS & GYNECOLOGY

## 2024-06-26 RX ORDER — DROSPIRENONE 4 MG/1
1 TABLET, FILM COATED ORAL DAILY
Qty: 90 TABLET | Refills: 4 | Status: SHIPPED | OUTPATIENT
Start: 2024-06-26

## 2024-06-26 NOTE — PROGRESS NOTES
Berenice Cates is a 48 y.o. female presents for a problem visit.    Chief Complaint   Patient presents with    Irregular Menses     Patient's last menstrual period was 06/22/2024.  Birth Control: tubal ligation.  Last Pap: normal obtained 1 year(s) ago.    The patient is reporting having:  irregular bleeding  for a few years now.   Had a cycle 2x in March, didn't have a cycle for April  In May spotted for 1 day and a normal cycle for June.   Severe cramping associated radiating down her right leg.   Pt is wondering if she is perimenopausal.             Examination chaperoned by Lina Dempsey MA.

## 2024-06-26 NOTE — PROGRESS NOTES
GYN Problem Visit Note    Chief Complaint   Irregular Menses   Patient's last menstrual period was 06/22/2024..      ADRIANE Cates is a 48 y.o. female who complains of   Chief Complaint   Patient presents with    Irregular Menses       She reports irregular bleeding where she can have more than 1 period a month.  She reports sometimes they are heavy sometime light.  Never soaks more than a pad every few hours.  She notices a left sided pain that radiates down her leg during her cycle. She has previously had her tubes tied.     Per Nursing Note:  Patient's last menstrual period was 06/22/2024.  Birth Control: tubal ligation.  Last Pap: normal obtained 1 year(s) ago.     The patient is reporting having:  irregular bleeding  for a few years now.   Had a cycle 2x in March, didn't have a cycle for April  In May spotted for 1 day and a normal cycle for June.   Severe cramping associated radiating down her right leg.   Pt is wondering if she is perimenopausal.     Past Medical History:   Diagnosis Date    Delivery normal     Hypertension      Past Surgical History:   Procedure Laterality Date    CHOLECYSTECTOMY      GYN      tubal ligation    OTHER SURGICAL HISTORY      pancreatic cyst  removed     Social History     Occupational History    Not on file   Tobacco Use    Smoking status: Never    Smokeless tobacco: Never   Vaping Use    Vaping Use: Never used   Substance and Sexual Activity    Alcohol use: No    Drug use: No    Sexual activity: Yes     Partners: Male     Birth control/protection: Surgical     Comment: BTL     Family History   Problem Relation Age of Onset    Stroke Mother        No Known Allergies  Prior to Admission medications    Medication Sig Start Date End Date Taking? Authorizing Provider   Drospirenone (SLYND) 4 MG TABS Take 1 tablet by mouth daily Take active pills continuously and discard placebo pills. 6/26/24  Yes Amparo Metz MD   amLODIPine (NORVASC) 10 MG tablet Take 1 tablet by

## 2024-06-27 LAB — TSH SERPL DL<=0.05 MIU/L-ACNC: 0.68 UIU/ML (ref 0.36–3.74)

## 2024-06-28 RX ORDER — ACETAMINOPHEN AND CODEINE PHOSPHATE 120; 12 MG/5ML; MG/5ML
1 SOLUTION ORAL DAILY
Qty: 90 TABLET | Refills: 4 | Status: SHIPPED | OUTPATIENT
Start: 2024-06-28

## 2024-07-05 NOTE — PROGRESS NOTES
Berenice Cates is a 48 y.o. female presents for a problem visit.    Chief Complaint   Patient presents with    Follow-up     Patient's last menstrual period was 06/22/2024.    The patient is here a fu d/t having AUB.    The ultrasound today indicated:  TV ULTRASOUND PERFORMED. UTERUS IS ANTEVERTED, NORMAL IN SIZE AND ECHOGENICITY. ENDOMETRIUM MEASURES 4-5MM IN THICKNESS. NO EVIDENCE OF MASSES OR ABNORMALITIES ARE SEEN. RIGHT OVARY APPEARS WITHIN NORMAL LIMITS. LEFT OVARY APPEARS WITHIN NORMAL LIMITS. NO FREE FLUID SEEN IN THE CDS.           Examination chaperoned by Lina Dempsey MA.

## 2024-07-08 ENCOUNTER — OFFICE VISIT (OUTPATIENT)
Age: 49
End: 2024-07-08
Payer: MEDICAID

## 2024-07-08 VITALS
DIASTOLIC BLOOD PRESSURE: 90 MMHG | BODY MASS INDEX: 30.29 KG/M2 | HEART RATE: 84 BPM | SYSTOLIC BLOOD PRESSURE: 153 MMHG | WEIGHT: 171 LBS

## 2024-07-08 DIAGNOSIS — N93.8 DUB (DYSFUNCTIONAL UTERINE BLEEDING): ICD-10-CM

## 2024-07-08 DIAGNOSIS — R10.2 PELVIC PAIN: Primary | ICD-10-CM

## 2024-07-08 PROCEDURE — 3080F DIAST BP >= 90 MM HG: CPT | Performed by: OBSTETRICS & GYNECOLOGY

## 2024-07-08 PROCEDURE — 99212 OFFICE O/P EST SF 10 MIN: CPT | Performed by: OBSTETRICS & GYNECOLOGY

## 2024-07-08 PROCEDURE — 3077F SYST BP >= 140 MM HG: CPT | Performed by: OBSTETRICS & GYNECOLOGY

## 2024-07-08 NOTE — PROGRESS NOTES
GYN Problem Visit Note    Chief Complaint   Follow-up   Patient's last menstrual period was 06/22/2024..      ADRIANE Cates is a 48 y.o. female who complains of   Chief Complaint   Patient presents with    Follow-up       She reports continued pelvic pain that shoots down into her leg with period.  Her periods have been irregular but not heavy.  She has not yet started the Slynd    Per Nursing Note:  Patient's last menstrual period was 06/22/2024.     The patient is here a fu d/t having AUB.     The ultrasound today indicated:  TV ULTRASOUND PERFORMED. UTERUS IS ANTEVERTED, NORMAL IN SIZE AND ECHOGENICITY. ENDOMETRIUM MEASURES 4-5MM IN THICKNESS. NO EVIDENCE OF MASSES OR ABNORMALITIES ARE SEEN. RIGHT OVARY APPEARS WITHIN NORMAL LIMITS. LEFT OVARY APPEARS WITHIN NORMAL LIMITS. NO FREE FLUID SEEN IN THE CDS.     Past Medical History:   Diagnosis Date    Delivery normal     Hypertension      Past Surgical History:   Procedure Laterality Date    CHOLECYSTECTOMY      GYN      tubal ligation    OTHER SURGICAL HISTORY      pancreatic cyst  removed     Social History     Occupational History    Not on file   Tobacco Use    Smoking status: Never    Smokeless tobacco: Never   Vaping Use    Vaping Use: Never used   Substance and Sexual Activity    Alcohol use: No    Drug use: No    Sexual activity: Yes     Partners: Male     Birth control/protection: Surgical     Comment: BTL     Family History   Problem Relation Age of Onset    Stroke Mother        No Known Allergies  Prior to Admission medications    Medication Sig Start Date End Date Taking? Authorizing Provider   norethindrone (ORTHO MICRONOR) 0.35 MG tablet Take 1 tablet by mouth daily 6/28/24   Amparo Metz MD   Drospirenone (SLYND) 4 MG TABS Take 1 tablet by mouth daily Take active pills continuously and discard placebo pills. 6/26/24   Amparo Metz MD   ondansetron (ZOFRAN-ODT) 4 MG disintegrating tablet Take 1 tablet by mouth 3 times daily as

## 2024-07-11 NOTE — ED NOTES
Patient has changed into gown per protocol. Patient given blanket for comfort. Call bell in reach.
The patient was discharged home by Dr. Jessica Palmer in stable condition. The patient is alert and oriented, in no respiratory distress. The patient's diagnosis, condition and treatment were explained. The patient expressed understanding. Two prescriptions given. No work/school note given. A discharge plan has been developed. A  was not involved in the process. Aftercare instructions were given. Pt ambulatory out of the ED.
TOLU

## 2024-08-04 ENCOUNTER — HOSPITAL ENCOUNTER (EMERGENCY)
Facility: HOSPITAL | Age: 49
Discharge: HOME | End: 2024-08-04
Attending: EMERGENCY MEDICINE

## 2024-08-04 VITALS
DIASTOLIC BLOOD PRESSURE: 89 MMHG | WEIGHT: 174.6 LBS | HEIGHT: 63 IN | BODY MASS INDEX: 30.94 KG/M2 | SYSTOLIC BLOOD PRESSURE: 161 MMHG | HEART RATE: 78 BPM | RESPIRATION RATE: 15 BRPM | TEMPERATURE: 97.9 F | OXYGEN SATURATION: 98 %

## 2024-08-04 DIAGNOSIS — M75.81 TENDINITIS OF RIGHT ROTATOR CUFF: Primary | ICD-10-CM

## 2024-08-04 PROCEDURE — 99283 EMERGENCY DEPT VISIT LOW MDM: CPT

## 2024-08-04 RX ORDER — ACETAMINOPHEN 500 MG
1000 TABLET ORAL EVERY 6 HOURS PRN
Qty: 40 TABLET | Refills: 0 | Status: SHIPPED | OUTPATIENT
Start: 2024-08-04

## 2024-08-04 RX ORDER — IBUPROFEN 800 MG/1
800 TABLET ORAL EVERY 6 HOURS PRN
Qty: 21 TABLET | Refills: 0 | Status: SHIPPED | OUTPATIENT
Start: 2024-08-04

## 2024-08-04 ASSESSMENT — PAIN SCALES - GENERAL: PAINLEVEL_OUTOF10: 10

## 2024-08-04 ASSESSMENT — PAIN - FUNCTIONAL ASSESSMENT: PAIN_FUNCTIONAL_ASSESSMENT: 0-10

## 2024-08-05 NOTE — ED NOTES
Patient ambulatory at discharge with discharge instructions reviewed. Prescriptions sent to preferred pharmacy.

## 2024-08-05 NOTE — ED PROVIDER NOTES
[facility] EMERGENCY DEPT  EMERGENCY DEPARTMENT ENCOUNTER      Pt Name: Berenice Cates  MRN: [id number]  Birthdate 1975  Date of evaluation: 8/4/2024  Provider: Nik Angeles MD    CHIEF COMPLAINT       Chief Complaint   Patient presents with    Shoulder Pain         HISTORY OF PRESENT ILLNESS    48-year-old female presents with 4 months of ongoing right shoulder pain that is worse with movement.  She had no known inciting injury.            Review of External Medical Records:     Nursing Notes were reviewed.    REVIEW OF SYSTEMS       Review of Systems    Except as noted above the remainder of the review of systems was reviewed and negative.       PAST MEDICAL HISTORY     Past Medical History:   Diagnosis Date    Delivery normal     Hypertension          SURGICAL HISTORY       Past Surgical History:   Procedure Laterality Date    CHOLECYSTECTOMY      GYN      tubal ligation    OTHER SURGICAL HISTORY      pancreatic cyst  removed         CURRENT MEDICATIONS       Discharge Medication List as of 8/4/2024  9:53 PM        CONTINUE these medications which have NOT CHANGED    Details   norethindrone (ORTHO MICRONOR) 0.35 MG tablet Take 1 tablet by mouth daily, Disp-90 tablet, R-4Normal      Drospirenone (SLYND) 4 MG TABS Take 1 tablet by mouth daily Take active pills continuously and discard placebo pills., Disp-90 tablet, R-4Normal      ondansetron (ZOFRAN-ODT) 4 MG disintegrating tablet Take 1 tablet by mouth 3 times daily as needed for Nausea or Vomiting, Disp-21 tablet, R-0Normal      amLODIPine (NORVASC) 10 MG tablet Take 1 tablet by mouth dailyHistorical Med      ergocalciferol (ERGOCALCIFEROL) 1.25 MG (92412 UT) capsule Take 1 capsule by mouth every 7 daysHistorical Med      lisinopril (PRINIVIL;ZESTRIL) 40 MG tablet Take 1 tablet by mouth dailyHistorical Med             ALLERGIES     Patient has no known allergies.    FAMILY HISTORY       Family History   Problem Relation Age of Onset    Stroke Mother

## 2024-08-07 ENCOUNTER — OFFICE VISIT (OUTPATIENT)
Age: 49
End: 2024-08-07

## 2024-08-07 ENCOUNTER — HOSPITAL ENCOUNTER (OUTPATIENT)
Facility: HOSPITAL | Age: 49
Discharge: HOME | End: 2024-08-10
Attending: FAMILY MEDICINE

## 2024-08-07 VITALS — HEIGHT: 63 IN | BODY MASS INDEX: 30.12 KG/M2 | WEIGHT: 170 LBS

## 2024-08-07 DIAGNOSIS — Z12.31 BREAST CANCER SCREENING BY MAMMOGRAM: Primary | ICD-10-CM

## 2024-08-07 DIAGNOSIS — Z12.31 VISIT FOR SCREENING MAMMOGRAM: ICD-10-CM

## 2024-08-07 PROCEDURE — 77067 SCR MAMMO BI INCL CAD: CPT

## 2024-08-07 NOTE — PROGRESS NOTES
EVERY WOMANS LIFE HISTORY QUESTIONNAIRE       No Yes Comments   Has a doctor ever seen or felt anything wrong with your breast? [x]                                  []                                     Have you ever had a breast biopsy? [x]                                  []                                          When and where was last mammogram performed?  4/6/2023 [facility]    Have you ever been told that there was a problem on your mammogram?   No Yes Comments   [x]                                  []                                       Do you have breast implants?   No Yes Comments   [x]                                  []                                       When was your last Pap test performed? 4/2023 [facility]. NIL/HPV-. Pt declines pelvic.    Have you ever had an abnormal Pap test?   No Yes Comments   [x]                                  []                                       Have you had a hysterectomy?   No Yes Comments (why)   [x]                                  []                                       Have you been through menopause?   No Yes Date of LMP   [x]                                  []                                  8/5/2024-irregular periods--perimenopause     Did your mother take CHATO?  No Yes Unknown   []                                  []                                  x     Do you have a history of HIV exposure?  No Yes    [x]                                  []                                       Have you ever been diagnosed with any type of Cancer   No Yes Comments (type,when,where,type of treatment   [x]                                  []                                          Has a family member been diagnosed with breast or ovarian cancer?   No Yes Comments (which family members, and type   [x]                                  []                                       Are you taking hormone replacement therapy (HRT)     No Yes Comments   [x]

## 2024-08-07 NOTE — PROGRESS NOTES
Berenice Cates is a 48 y.o. female   Chief Complaint   Patient presents with    [facility]         ASSESSMENT AND PLAN:    1. Breast cancer screening by mammogram  CBE WNL today; proceed with screening mammogram.  Return annually for screening unless otherwise indicated.    SUBJECTIVE:    HPI:  Berenice Cates is a 48 y.o. female - , perimenopausal - who presents to [facility] for breast cancer screening. She denies breast pain, lumps, nipple discharge, nipple inversion and overlying skin changes. No family history of breast, ovarian or colon cancer.    Review of Systems   Constitutional: Negative.    Respiratory: Negative.     Cardiovascular: Negative.    Gastrointestinal: Negative.    Genitourinary:  Negative for dyspareunia, dysuria, genital sores, pelvic pain, vaginal discharge and vaginal pain.     Physical Exam  Constitutional:       General: She is not in acute distress.     Appearance: Normal appearance.   Pulmonary:      Effort: Pulmonary effort is normal.   Chest:   Breasts:     Right: Normal. No inverted nipple, mass, nipple discharge, skin change or tenderness.      Left: Normal. No inverted nipple, mass, nipple discharge, skin change or tenderness.   Lymphadenopathy:      Upper Body:      Right upper body: No supraclavicular, axillary or pectoral adenopathy.      Left upper body: No supraclavicular, axillary or pectoral adenopathy.   Neurological:      Mental Status: She is alert and oriented to person, place, and time.